# Patient Record
Sex: MALE | Race: WHITE | Employment: OTHER | URBAN - METROPOLITAN AREA
[De-identification: names, ages, dates, MRNs, and addresses within clinical notes are randomized per-mention and may not be internally consistent; named-entity substitution may affect disease eponyms.]

---

## 2017-01-03 ENCOUNTER — HOSPITAL ENCOUNTER (OUTPATIENT)
Dept: PET IMAGING | Age: 62
Discharge: HOME | End: 2017-01-03
Payer: COMMERCIAL

## 2017-01-03 DIAGNOSIS — C01 CANCER OF BASE OF TONGUE (HCC): ICD-10-CM

## 2017-01-03 PROCEDURE — 74011636320 HC RX REV CODE- 636/320: Performed by: OTOLARYNGOLOGY

## 2017-01-03 PROCEDURE — A9552 F18 FDG: HCPCS

## 2017-01-03 RX ORDER — SODIUM CHLORIDE 0.9 % (FLUSH) 0.9 %
10 SYRINGE (ML) INJECTION
Status: COMPLETED | OUTPATIENT
Start: 2017-01-03 | End: 2017-01-03

## 2017-01-03 RX ADMIN — DIATRIZOATE MEGLUMINE AND DIATRIZOATE SODIUM 10 ML: 660; 100 LIQUID ORAL; RECTAL at 07:21

## 2017-01-03 RX ADMIN — Medication 10 ML: at 07:21

## 2017-01-25 ENCOUNTER — HOSPITAL ENCOUNTER (OUTPATIENT)
Dept: LAB | Age: 62
Discharge: HOME | End: 2017-01-25
Payer: COMMERCIAL

## 2017-01-25 DIAGNOSIS — C01 CANCER OF BASE OF TONGUE (HCC): ICD-10-CM

## 2017-01-25 LAB
ALBUMIN SERPL BCP-MCNC: 3.7 G/DL (ref 3.2–4.6)
ALBUMIN/GLOB SERPL: 1.2 {RATIO} (ref 1.2–3.5)
ALP SERPL-CCNC: 67 U/L (ref 50–136)
ALT SERPL-CCNC: 43 U/L (ref 12–65)
ANION GAP BLD CALC-SCNC: 7 MMOL/L (ref 7–16)
AST SERPL W P-5'-P-CCNC: 30 U/L (ref 15–37)
BASOPHILS # BLD AUTO: 0 K/UL (ref 0–0.2)
BASOPHILS # BLD: 0 % (ref 0–2)
BILIRUB SERPL-MCNC: 0.4 MG/DL (ref 0.2–1.1)
BUN SERPL-MCNC: 26 MG/DL (ref 8–23)
CALCIUM SERPL-MCNC: 9 MG/DL (ref 8.3–10.4)
CHLORIDE SERPL-SCNC: 105 MMOL/L (ref 98–107)
CO2 SERPL-SCNC: 27 MMOL/L (ref 23–32)
CREAT SERPL-MCNC: 1.2 MG/DL (ref 0.8–1.5)
DIFFERENTIAL METHOD BLD: ABNORMAL
EOSINOPHIL # BLD: 0.1 K/UL (ref 0–0.8)
EOSINOPHIL NFR BLD: 3 % (ref 0.5–7.8)
ERYTHROCYTE [DISTWIDTH] IN BLOOD BY AUTOMATED COUNT: 12.2 % (ref 11.9–14.6)
GLOBULIN SER CALC-MCNC: 3 G/DL (ref 2.3–3.5)
GLUCOSE SERPL-MCNC: 113 MG/DL (ref 65–100)
HCT VFR BLD AUTO: 41.7 % (ref 41.1–50.3)
HGB BLD-MCNC: 14.3 G/DL (ref 13.6–17.2)
LYMPHOCYTES # BLD AUTO: 16 % (ref 13–44)
LYMPHOCYTES # BLD: 0.6 K/UL (ref 0.5–4.6)
MCH RBC QN AUTO: 32.3 PG (ref 26.1–32.9)
MCHC RBC AUTO-ENTMCNC: 34.3 G/DL (ref 31.4–35)
MCV RBC AUTO: 94.1 FL (ref 79.6–97.8)
MONOCYTES # BLD: 0.5 K/UL (ref 0.1–1.3)
MONOCYTES NFR BLD AUTO: 13 % (ref 4–12)
NEUTS SEG # BLD: 2.5 K/UL (ref 1.7–8.2)
NEUTS SEG NFR BLD AUTO: 67 % (ref 43–78)
NRBC # BLD: 0 K/UL (ref 0–0.2)
PLATELET # BLD AUTO: 142 K/UL (ref 150–450)
PMV BLD AUTO: 10.2 FL (ref 10.8–14.1)
POTASSIUM SERPL-SCNC: 4.2 MMOL/L (ref 3.5–5.1)
PROT SERPL-MCNC: 6.7 G/DL (ref 6.3–8.2)
RBC # BLD AUTO: 4.43 M/UL (ref 4.23–5.67)
SODIUM SERPL-SCNC: 139 MMOL/L (ref 136–145)
WBC # BLD AUTO: 3.7 K/UL (ref 4.3–11.1)

## 2017-01-25 PROCEDURE — 36415 COLL VENOUS BLD VENIPUNCTURE: CPT | Performed by: NURSE PRACTITIONER

## 2017-01-25 PROCEDURE — 80053 COMPREHEN METABOLIC PANEL: CPT | Performed by: NURSE PRACTITIONER

## 2017-01-25 PROCEDURE — 85025 COMPLETE CBC W/AUTO DIFF WBC: CPT | Performed by: NURSE PRACTITIONER

## 2017-07-25 ENCOUNTER — HOSPITAL ENCOUNTER (OUTPATIENT)
Dept: LAB | Age: 62
Discharge: HOME | End: 2017-07-25
Payer: COMMERCIAL

## 2017-07-25 DIAGNOSIS — C76.0 HEAD AND NECK CANCER (HCC): Chronic | ICD-10-CM

## 2017-07-25 DIAGNOSIS — R53.83 FATIGUE, UNSPECIFIED TYPE: ICD-10-CM

## 2017-07-25 LAB
ALBUMIN SERPL BCP-MCNC: 3.7 G/DL (ref 3.2–4.6)
ALBUMIN/GLOB SERPL: 1.1 {RATIO} (ref 1.2–3.5)
ALP SERPL-CCNC: 76 U/L (ref 50–136)
ALT SERPL-CCNC: 51 U/L (ref 12–65)
ANION GAP BLD CALC-SCNC: 6 MMOL/L (ref 7–16)
AST SERPL W P-5'-P-CCNC: 39 U/L (ref 15–37)
BASOPHILS # BLD AUTO: 0 K/UL (ref 0–0.2)
BASOPHILS # BLD: 1 % (ref 0–2)
BILIRUB SERPL-MCNC: 0.7 MG/DL (ref 0.2–1.1)
BUN SERPL-MCNC: 26 MG/DL (ref 8–23)
CALCIUM SERPL-MCNC: 9.2 MG/DL (ref 8.3–10.4)
CHLORIDE SERPL-SCNC: 108 MMOL/L (ref 98–107)
CO2 SERPL-SCNC: 26 MMOL/L (ref 21–32)
CREAT SERPL-MCNC: 1.25 MG/DL (ref 0.8–1.5)
DIFFERENTIAL METHOD BLD: ABNORMAL
EOSINOPHIL # BLD: 0.1 K/UL (ref 0–0.8)
EOSINOPHIL NFR BLD: 3 % (ref 0.5–7.8)
ERYTHROCYTE [DISTWIDTH] IN BLOOD BY AUTOMATED COUNT: 12.6 % (ref 11.9–14.6)
GLOBULIN SER CALC-MCNC: 3.4 G/DL (ref 2.3–3.5)
GLUCOSE SERPL-MCNC: 127 MG/DL (ref 65–100)
HCT VFR BLD AUTO: 41.9 % (ref 41.1–50.3)
HGB BLD-MCNC: 14.9 G/DL (ref 13.6–17.2)
LYMPHOCYTES # BLD AUTO: 16 % (ref 13–44)
LYMPHOCYTES # BLD: 0.6 K/UL (ref 0.5–4.6)
MCH RBC QN AUTO: 32.8 PG (ref 26.1–32.9)
MCHC RBC AUTO-ENTMCNC: 35.6 G/DL (ref 31.4–35)
MCV RBC AUTO: 92.3 FL (ref 79.6–97.8)
MONOCYTES # BLD: 0.5 K/UL (ref 0.1–1.3)
MONOCYTES NFR BLD AUTO: 13 % (ref 4–12)
NEUTS SEG # BLD: 2.4 K/UL (ref 1.7–8.2)
NEUTS SEG NFR BLD AUTO: 67 % (ref 43–78)
NRBC # BLD: 0 K/UL (ref 0–0.2)
PLATELET # BLD AUTO: 152 K/UL (ref 150–450)
PMV BLD AUTO: 10.1 FL (ref 10.8–14.1)
POTASSIUM SERPL-SCNC: 4.1 MMOL/L (ref 3.5–5.1)
PROT SERPL-MCNC: 7.1 G/DL (ref 6.3–8.2)
RBC # BLD AUTO: 4.54 M/UL (ref 4.23–5.67)
SODIUM SERPL-SCNC: 140 MMOL/L (ref 136–145)
TSH SERPL DL<=0.005 MIU/L-ACNC: 3.82 UIU/ML (ref 0.36–3.74)
VIT B12 SERPL-MCNC: 681 PG/ML (ref 193–986)
WBC # BLD AUTO: 3.6 K/UL (ref 4.3–11.1)

## 2017-07-25 PROCEDURE — 36415 COLL VENOUS BLD VENIPUNCTURE: CPT | Performed by: INTERNAL MEDICINE

## 2017-07-25 PROCEDURE — 85025 COMPLETE CBC W/AUTO DIFF WBC: CPT | Performed by: INTERNAL MEDICINE

## 2017-07-25 PROCEDURE — 80053 COMPREHEN METABOLIC PANEL: CPT | Performed by: INTERNAL MEDICINE

## 2017-07-25 PROCEDURE — 82607 VITAMIN B-12: CPT | Performed by: INTERNAL MEDICINE

## 2017-07-25 PROCEDURE — 84443 ASSAY THYROID STIM HORMONE: CPT | Performed by: INTERNAL MEDICINE

## 2017-11-15 ENCOUNTER — APPOINTMENT (RX ONLY)
Dept: URBAN - METROPOLITAN AREA CLINIC 349 | Facility: CLINIC | Age: 62
Setting detail: DERMATOLOGY
End: 2017-11-15

## 2017-11-15 DIAGNOSIS — L57.0 ACTINIC KERATOSIS: ICD-10-CM

## 2017-11-15 DIAGNOSIS — L81.4 OTHER MELANIN HYPERPIGMENTATION: ICD-10-CM

## 2017-11-15 DIAGNOSIS — L82.1 OTHER SEBORRHEIC KERATOSIS: ICD-10-CM

## 2017-11-15 PROCEDURE — A4550 SURGICAL TRAYS: HCPCS

## 2017-11-15 PROCEDURE — ? BIOPSY BY SHAVE METHOD

## 2017-11-15 PROCEDURE — ? COUNSELING

## 2017-11-15 PROCEDURE — ? PATHOLOGY BILLING

## 2017-11-15 PROCEDURE — ? RECOMMENDATIONS

## 2017-11-15 PROCEDURE — 88305 TISSUE EXAM BY PATHOLOGIST: CPT

## 2017-11-15 PROCEDURE — 99242 OFF/OP CONSLTJ NEW/EST SF 20: CPT | Mod: 25

## 2017-11-15 PROCEDURE — ? LIQUID NITROGEN

## 2017-11-15 PROCEDURE — 11306 SHAVE SKIN LESION 0.6-1.0 CM: CPT | Mod: 59

## 2017-11-15 PROCEDURE — 17000 DESTRUCT PREMALG LESION: CPT

## 2017-11-15 ASSESSMENT — LOCATION SIMPLE DESCRIPTION DERM
LOCATION SIMPLE: LEFT PRETIBIAL REGION
LOCATION SIMPLE: RIGHT PRETIBIAL REGION
LOCATION SIMPLE: RIGHT FOREHEAD
LOCATION SIMPLE: LEFT FOREHEAD
LOCATION SIMPLE: LEFT FOREHEAD

## 2017-11-15 ASSESSMENT — LOCATION DETAILED DESCRIPTION DERM
LOCATION DETAILED: LEFT FOREHEAD
LOCATION DETAILED: LEFT PROXIMAL PRETIBIAL REGION
LOCATION DETAILED: LEFT FOREHEAD
LOCATION DETAILED: RIGHT PROXIMAL PRETIBIAL REGION
LOCATION DETAILED: LEFT LATERAL FOREHEAD
LOCATION DETAILED: RIGHT INFERIOR MEDIAL FOREHEAD

## 2017-11-15 ASSESSMENT — LOCATION ZONE DERM
LOCATION ZONE: LEG
LOCATION ZONE: FACE
LOCATION ZONE: FACE

## 2017-11-15 NOTE — PROCEDURE: BIOPSY BY SHAVE METHOD
Additional Anesthesia Volume In Cc (Will Not Render If 0): 0
Anesthesia Volume In Cc (Will Not Render If 0): 0.5
Wound Care: Vaseline
Detail Level: Detailed
Accession #: [email or web address]
Bill For Surgical Tray: yes
Post-Care Instructions: I reviewed with the patient in detail post-care instructions. Patient is to keep the biopsy site dry overnight, and then apply bacitracin twice daily until healed. Patient may apply hydrogen peroxide soaks to remove any crusting.\\nAft the procedure, the patient was observed for 5-10 minutes and was oriented to person, place, and time.  Denied feeling dizzy, queasy, and stated that they did not feel that they were going to faint.
Hemostasis: Aluminum Chloride
Bill 99032 For Specimen Handling/Conveyance To Laboratory?: no
Biopsy Method: Tamera pruitt
Notification Instructions: Patient will be notified of biopsy results. However, patient instructed to call the office if not contacted within 2 weeks.
Billing Type: Third-Party Bill
Type Of Destruction Used: Electrodesiccation
Anesthesia Type: 1% lidocaine with 1:500,000 epinephrine and a 1:10 solution of 8.4% sodium bicarbonate
Biopsy Type: H and E
Electrodesiccation Text: The wound bed was treated with electrodesiccation after the biopsy was performed.
Dressing: bandage
Cryotherapy Text: The wound bed was treated with cryotherapy after the biopsy was performed.
Size Of Lesion In Cm: 0.6
Consent: Written consent was obtained and risks were reviewed including but not limited to scarring, infection, bleeding, scabbing, incomplete removal, nerve damage and allergy to anesthesia.

## 2017-11-15 NOTE — HPI: SKIN LESION
How Severe Is Your Skin Lesion?: moderate
Has Your Skin Lesion Been Treated?: not been treated
Is This A New Presentation, Or A Follow-Up?: Skin Lesion
Additional History: Patient states he has a hx squamous cell carcinoma of the tongue/throat.

## 2018-01-31 ENCOUNTER — HOSPITAL ENCOUNTER (OUTPATIENT)
Dept: LAB | Age: 63
Discharge: HOME | End: 2018-01-31
Payer: COMMERCIAL

## 2018-01-31 DIAGNOSIS — C09.9 SQUAMOUS CELL CARCINOMA OF TONSIL (HCC): ICD-10-CM

## 2018-01-31 LAB
ALBUMIN SERPL-MCNC: 3.8 G/DL (ref 3.2–4.6)
ALBUMIN/GLOB SERPL: 1.2 {RATIO} (ref 1.2–3.5)
ALP SERPL-CCNC: 67 U/L (ref 50–136)
ALT SERPL-CCNC: 70 U/L (ref 12–65)
ANION GAP SERPL CALC-SCNC: 7 MMOL/L (ref 7–16)
AST SERPL-CCNC: 48 U/L (ref 15–37)
BASOPHILS # BLD: 0 K/UL (ref 0–0.2)
BASOPHILS NFR BLD: 1 % (ref 0–2)
BILIRUB SERPL-MCNC: 0.5 MG/DL (ref 0.2–1.1)
BUN SERPL-MCNC: 29 MG/DL (ref 8–23)
CALCIUM SERPL-MCNC: 9.2 MG/DL (ref 8.3–10.4)
CHLORIDE SERPL-SCNC: 108 MMOL/L (ref 98–107)
CO2 SERPL-SCNC: 26 MMOL/L (ref 21–32)
CREAT SERPL-MCNC: 1.28 MG/DL (ref 0.8–1.5)
DIFFERENTIAL METHOD BLD: ABNORMAL
EOSINOPHIL # BLD: 0.1 K/UL (ref 0–0.8)
EOSINOPHIL NFR BLD: 2 % (ref 0.5–7.8)
ERYTHROCYTE [DISTWIDTH] IN BLOOD BY AUTOMATED COUNT: 12.2 % (ref 11.9–14.6)
GLOBULIN SER CALC-MCNC: 3.1 G/DL (ref 2.3–3.5)
GLUCOSE SERPL-MCNC: 88 MG/DL (ref 65–100)
HCT VFR BLD AUTO: 41.8 % (ref 41.1–50.3)
HGB BLD-MCNC: 14.6 G/DL (ref 13.6–17.2)
LYMPHOCYTES # BLD: 0.6 K/UL (ref 0.5–4.6)
LYMPHOCYTES NFR BLD: 17 % (ref 13–44)
MCH RBC QN AUTO: 32.7 PG (ref 26.1–32.9)
MCHC RBC AUTO-ENTMCNC: 34.9 G/DL (ref 31.4–35)
MCV RBC AUTO: 93.5 FL (ref 79.6–97.8)
MONOCYTES # BLD: 0.5 K/UL (ref 0.1–1.3)
MONOCYTES NFR BLD: 14 % (ref 4–12)
NEUTS SEG # BLD: 2.4 K/UL (ref 1.7–8.2)
NEUTS SEG NFR BLD: 67 % (ref 43–78)
NRBC # BLD: 0 K/UL (ref 0–0.2)
PLATELET # BLD AUTO: 170 K/UL (ref 150–450)
PMV BLD AUTO: 10.2 FL (ref 10.8–14.1)
POTASSIUM SERPL-SCNC: 4.3 MMOL/L (ref 3.5–5.1)
PROT SERPL-MCNC: 6.9 G/DL (ref 6.3–8.2)
RBC # BLD AUTO: 4.47 M/UL (ref 4.23–5.67)
SODIUM SERPL-SCNC: 141 MMOL/L (ref 136–145)
WBC # BLD AUTO: 3.6 K/UL (ref 4.3–11.1)

## 2018-01-31 PROCEDURE — 80053 COMPREHEN METABOLIC PANEL: CPT | Performed by: INTERNAL MEDICINE

## 2018-01-31 PROCEDURE — 85025 COMPLETE CBC W/AUTO DIFF WBC: CPT | Performed by: INTERNAL MEDICINE

## 2018-01-31 PROCEDURE — 36415 COLL VENOUS BLD VENIPUNCTURE: CPT | Performed by: INTERNAL MEDICINE

## 2018-02-22 ENCOUNTER — APPOINTMENT (RX ONLY)
Dept: URBAN - METROPOLITAN AREA CLINIC 349 | Facility: CLINIC | Age: 63
Setting detail: DERMATOLOGY
End: 2018-02-22

## 2018-02-22 DIAGNOSIS — L81.4 OTHER MELANIN HYPERPIGMENTATION: ICD-10-CM

## 2018-02-22 DIAGNOSIS — L57.0 ACTINIC KERATOSIS: ICD-10-CM | Status: RESOLVED

## 2018-02-22 PROCEDURE — ? COUNSELING

## 2018-02-22 PROCEDURE — 99213 OFFICE O/P EST LOW 20 MIN: CPT

## 2018-02-22 ASSESSMENT — LOCATION DETAILED DESCRIPTION DERM
LOCATION DETAILED: LEFT FOREHEAD
LOCATION DETAILED: RIGHT INFERIOR MEDIAL FOREHEAD
LOCATION DETAILED: RIGHT CENTRAL MALAR CHEEK
LOCATION DETAILED: LEFT SUPERIOR CENTRAL MALAR CHEEK

## 2018-02-22 ASSESSMENT — LOCATION SIMPLE DESCRIPTION DERM
LOCATION SIMPLE: LEFT CHEEK
LOCATION SIMPLE: RIGHT CHEEK
LOCATION SIMPLE: RIGHT FOREHEAD
LOCATION SIMPLE: LEFT FOREHEAD

## 2018-02-22 ASSESSMENT — LOCATION ZONE DERM: LOCATION ZONE: FACE

## 2018-05-24 ENCOUNTER — APPOINTMENT (RX ONLY)
Dept: URBAN - METROPOLITAN AREA CLINIC 349 | Facility: CLINIC | Age: 63
Setting detail: DERMATOLOGY
End: 2018-05-24

## 2018-05-24 PROBLEM — C44.319 BASAL CELL CARCINOMA OF SKIN OF OTHER PARTS OF FACE: Status: ACTIVE | Noted: 2018-05-24

## 2018-05-24 PROCEDURE — ? COUNSELING

## 2018-05-24 PROCEDURE — A4550 SURGICAL TRAYS: HCPCS

## 2018-05-24 PROCEDURE — ? PATHOLOGY BILLING

## 2018-05-24 PROCEDURE — 11100: CPT

## 2018-05-24 PROCEDURE — ? BIOPSY BY SHAVE METHOD

## 2018-05-24 PROCEDURE — 88305 TISSUE EXAM BY PATHOLOGIST: CPT

## 2018-05-24 NOTE — PROCEDURE: BIOPSY BY SHAVE METHOD
Biopsy Method: Tamera pruitt
Notification Instructions: Patient will be notified of biopsy results. However, patient instructed to call the office if not contacted within 2 weeks.
Electrodesiccation Text: The wound bed was treated with electrodesiccation after the biopsy was performed.
Accession #: [email or web address]
Type Of Destruction Used: Electrodesiccation
Render Post-Care Instructions In Note?: yes
Hemostasis: Aluminum Chloride
Size Of Lesion In Cm: 0
Destruction After The Procedure: No
Billing Type: Third-Party Bill
Consent: Written consent was obtained and risks were reviewed including but not limited to scarring, infection, bleeding, scabbing, incomplete removal, nerve damage and allergy to anesthesia.
Cryotherapy Text: The wound bed was treated with cryotherapy after the biopsy was performed.
Biopsy Type: H and E
X Size Of Lesion In Cm: 0.6
Post-Care Instructions: I reviewed with the patient in detail post-care instructions. Patient is to keep the biopsy site dry overnight, and then apply bacitracin twice daily until healed. Patient may apply hydrogen peroxide soaks to remove any crusting.\\nAft the procedure, the patient was observed for 5-10 minutes and was oriented to person, place, and time.  Denied feeling dizzy, queasy, and stated that they did not feel that they were going to faint.
Wound Care: Vaseline
Anesthesia Type: 1% lidocaine with 1:500,000 epinephrine and a 1:10 solution of 8.4% sodium bicarbonate
Detail Level: Detailed
Dressing: bandage
Anesthesia Volume In Cc (Will Not Render If 0): 0.5

## 2018-07-31 ENCOUNTER — HOSPITAL ENCOUNTER (OUTPATIENT)
Dept: LAB | Age: 63
Discharge: HOME | End: 2018-07-31
Payer: COMMERCIAL

## 2018-07-31 DIAGNOSIS — C09.9 SQUAMOUS CELL CARCINOMA OF TONSIL (HCC): ICD-10-CM

## 2018-07-31 LAB
ALBUMIN SERPL-MCNC: 3.8 G/DL (ref 3.2–4.6)
ALBUMIN/GLOB SERPL: 1.4 {RATIO} (ref 1.2–3.5)
ALP SERPL-CCNC: 69 U/L (ref 50–136)
ALT SERPL-CCNC: 84 U/L (ref 12–65)
ANION GAP SERPL CALC-SCNC: 8 MMOL/L (ref 7–16)
AST SERPL-CCNC: 57 U/L (ref 15–37)
BASOPHILS # BLD: 0 K/UL (ref 0–0.2)
BASOPHILS NFR BLD: 0 % (ref 0–2)
BILIRUB SERPL-MCNC: 0.4 MG/DL (ref 0.2–1.1)
BUN SERPL-MCNC: 29 MG/DL (ref 8–23)
CALCIUM SERPL-MCNC: 9.1 MG/DL (ref 8.3–10.4)
CHLORIDE SERPL-SCNC: 107 MMOL/L (ref 98–107)
CO2 SERPL-SCNC: 25 MMOL/L (ref 21–32)
CREAT SERPL-MCNC: 1.4 MG/DL (ref 0.8–1.5)
DIFFERENTIAL METHOD BLD: ABNORMAL
EOSINOPHIL # BLD: 0.1 K/UL (ref 0–0.8)
EOSINOPHIL NFR BLD: 4 % (ref 0.5–7.8)
ERYTHROCYTE [DISTWIDTH] IN BLOOD BY AUTOMATED COUNT: 12.3 % (ref 11.9–14.6)
GLOBULIN SER CALC-MCNC: 2.8 G/DL (ref 2.3–3.5)
GLUCOSE SERPL-MCNC: 129 MG/DL (ref 65–100)
HCT VFR BLD AUTO: 41.3 % (ref 41.1–50.3)
HGB BLD-MCNC: 14.4 G/DL (ref 13.6–17.2)
LYMPHOCYTES # BLD: 0.6 K/UL (ref 0.5–4.6)
LYMPHOCYTES NFR BLD: 17 % (ref 13–44)
MCH RBC QN AUTO: 32.4 PG (ref 26.1–32.9)
MCHC RBC AUTO-ENTMCNC: 34.9 G/DL (ref 31.4–35)
MCV RBC AUTO: 92.8 FL (ref 79.6–97.8)
MONOCYTES # BLD: 0.5 K/UL (ref 0.1–1.3)
MONOCYTES NFR BLD: 13 % (ref 4–12)
NEUTS SEG # BLD: 2.4 K/UL (ref 1.7–8.2)
NEUTS SEG NFR BLD: 66 % (ref 43–78)
NRBC # BLD: 0 K/UL (ref 0–0.2)
PLATELET # BLD AUTO: 165 K/UL (ref 150–450)
PMV BLD AUTO: 10.4 FL (ref 10.8–14.1)
POTASSIUM SERPL-SCNC: 4.4 MMOL/L (ref 3.5–5.1)
PROT SERPL-MCNC: 6.6 G/DL (ref 6.3–8.2)
RBC # BLD AUTO: 4.45 M/UL (ref 4.23–5.67)
SODIUM SERPL-SCNC: 140 MMOL/L (ref 136–145)
WBC # BLD AUTO: 3.6 K/UL (ref 4.3–11.1)

## 2018-07-31 PROCEDURE — 80053 COMPREHEN METABOLIC PANEL: CPT | Performed by: INTERNAL MEDICINE

## 2018-07-31 PROCEDURE — 85025 COMPLETE CBC W/AUTO DIFF WBC: CPT | Performed by: INTERNAL MEDICINE

## 2018-07-31 PROCEDURE — 36415 COLL VENOUS BLD VENIPUNCTURE: CPT | Performed by: INTERNAL MEDICINE

## 2019-02-04 ENCOUNTER — HOSPITAL ENCOUNTER (OUTPATIENT)
Dept: LAB | Age: 64
Discharge: HOME | End: 2019-02-04
Payer: MEDICARE

## 2019-02-04 DIAGNOSIS — C01 CANCER OF BASE OF TONGUE (HCC): ICD-10-CM

## 2019-02-04 DIAGNOSIS — E03.9 ACQUIRED HYPOTHYROIDISM: ICD-10-CM

## 2019-02-04 LAB
ALBUMIN SERPL-MCNC: 3.8 G/DL (ref 3.2–4.6)
ALBUMIN/GLOB SERPL: 1.2 {RATIO} (ref 1.2–3.5)
ALP SERPL-CCNC: 67 U/L (ref 50–136)
ALT SERPL-CCNC: 78 U/L (ref 12–65)
ANION GAP SERPL CALC-SCNC: 6 MMOL/L (ref 7–16)
AST SERPL-CCNC: 47 U/L (ref 15–37)
BASOPHILS # BLD: 0 K/UL (ref 0–0.2)
BASOPHILS NFR BLD: 1 % (ref 0–2)
BILIRUB SERPL-MCNC: 0.5 MG/DL (ref 0.2–1.1)
BUN SERPL-MCNC: 28 MG/DL (ref 8–23)
CALCIUM SERPL-MCNC: 8.9 MG/DL (ref 8.3–10.4)
CHLORIDE SERPL-SCNC: 106 MMOL/L (ref 98–107)
CO2 SERPL-SCNC: 27 MMOL/L (ref 21–32)
CREAT SERPL-MCNC: 1.29 MG/DL (ref 0.8–1.5)
DIFFERENTIAL METHOD BLD: ABNORMAL
EOSINOPHIL # BLD: 0.1 K/UL (ref 0–0.8)
EOSINOPHIL NFR BLD: 3 % (ref 0.5–7.8)
ERYTHROCYTE [DISTWIDTH] IN BLOOD BY AUTOMATED COUNT: 12.4 % (ref 11.9–14.6)
GLOBULIN SER CALC-MCNC: 3.1 G/DL (ref 2.3–3.5)
GLUCOSE SERPL-MCNC: 165 MG/DL (ref 65–100)
HCT VFR BLD AUTO: 42.5 % (ref 41.1–50.3)
HGB BLD-MCNC: 14.7 G/DL (ref 13.6–17.2)
IMM GRANULOCYTES # BLD AUTO: 0 K/UL (ref 0–0.5)
IMM GRANULOCYTES NFR BLD AUTO: 0 % (ref 0–5)
LYMPHOCYTES # BLD: 0.6 K/UL (ref 0.5–4.6)
LYMPHOCYTES NFR BLD: 16 % (ref 13–44)
MCH RBC QN AUTO: 32.7 PG (ref 26.1–32.9)
MCHC RBC AUTO-ENTMCNC: 34.6 G/DL (ref 31.4–35)
MCV RBC AUTO: 94.4 FL (ref 79.6–97.8)
MONOCYTES # BLD: 0.4 K/UL (ref 0.1–1.3)
MONOCYTES NFR BLD: 10 % (ref 4–12)
NEUTS SEG # BLD: 2.7 K/UL (ref 1.7–8.2)
NEUTS SEG NFR BLD: 70 % (ref 43–78)
NRBC # BLD: 0 K/UL (ref 0–0.2)
PLATELET # BLD AUTO: 155 K/UL (ref 150–450)
PMV BLD AUTO: 10.3 FL (ref 9.4–12.3)
POTASSIUM SERPL-SCNC: 4.3 MMOL/L (ref 3.5–5.1)
PROT SERPL-MCNC: 6.9 G/DL (ref 6.3–8.2)
RBC # BLD AUTO: 4.5 M/UL (ref 4.23–5.67)
SODIUM SERPL-SCNC: 139 MMOL/L (ref 136–145)
TSH SERPL DL<=0.005 MIU/L-ACNC: 2.42 UIU/ML (ref 0.36–3.74)
WBC # BLD AUTO: 3.9 K/UL (ref 4.3–11.1)

## 2019-02-04 PROCEDURE — 80053 COMPREHEN METABOLIC PANEL: CPT

## 2019-02-04 PROCEDURE — 85025 COMPLETE CBC W/AUTO DIFF WBC: CPT

## 2019-02-04 PROCEDURE — 84443 ASSAY THYROID STIM HORMONE: CPT

## 2019-02-04 PROCEDURE — 36415 COLL VENOUS BLD VENIPUNCTURE: CPT

## 2019-08-07 ENCOUNTER — HOSPITAL ENCOUNTER (OUTPATIENT)
Dept: LAB | Age: 64
Discharge: HOME | End: 2019-08-07
Payer: MEDICARE

## 2019-08-07 DIAGNOSIS — C09.9 SQUAMOUS CELL CARCINOMA OF TONSIL (HCC): ICD-10-CM

## 2019-08-07 LAB
ALBUMIN SERPL-MCNC: 3.5 G/DL (ref 3.2–4.6)
ALBUMIN/GLOB SERPL: 1.2 {RATIO} (ref 1.2–3.5)
ALP SERPL-CCNC: 70 U/L (ref 50–136)
ALT SERPL-CCNC: 56 U/L (ref 12–65)
ANION GAP SERPL CALC-SCNC: 7 MMOL/L (ref 7–16)
AST SERPL-CCNC: 36 U/L (ref 15–37)
BASOPHILS # BLD: 0 K/UL (ref 0–0.2)
BASOPHILS NFR BLD: 1 % (ref 0–2)
BILIRUB SERPL-MCNC: 0.6 MG/DL (ref 0.2–1.1)
BUN SERPL-MCNC: 20 MG/DL (ref 8–23)
CALCIUM SERPL-MCNC: 9.3 MG/DL (ref 8.3–10.4)
CHLORIDE SERPL-SCNC: 107 MMOL/L (ref 98–107)
CO2 SERPL-SCNC: 26 MMOL/L (ref 21–32)
CREAT SERPL-MCNC: 1.24 MG/DL (ref 0.8–1.5)
DIFFERENTIAL METHOD BLD: ABNORMAL
EOSINOPHIL # BLD: 0.1 K/UL (ref 0–0.8)
EOSINOPHIL NFR BLD: 2 % (ref 0.5–7.8)
ERYTHROCYTE [DISTWIDTH] IN BLOOD BY AUTOMATED COUNT: 12 % (ref 11.9–14.6)
GLOBULIN SER CALC-MCNC: 3 G/DL (ref 2.3–3.5)
GLUCOSE SERPL-MCNC: 84 MG/DL (ref 65–100)
HCT VFR BLD AUTO: 41.6 % (ref 41.1–50.3)
HGB BLD-MCNC: 14.2 G/DL (ref 13.6–17.2)
IMM GRANULOCYTES # BLD AUTO: 0 K/UL (ref 0–0.5)
IMM GRANULOCYTES NFR BLD AUTO: 0 % (ref 0–5)
LYMPHOCYTES # BLD: 0.8 K/UL (ref 0.5–4.6)
LYMPHOCYTES NFR BLD: 19 % (ref 13–44)
MCH RBC QN AUTO: 31.7 PG (ref 26.1–32.9)
MCHC RBC AUTO-ENTMCNC: 34.1 G/DL (ref 31.4–35)
MCV RBC AUTO: 92.9 FL (ref 79.6–97.8)
MONOCYTES # BLD: 0.5 K/UL (ref 0.1–1.3)
MONOCYTES NFR BLD: 12 % (ref 4–12)
NEUTS SEG # BLD: 2.7 K/UL (ref 1.7–8.2)
NEUTS SEG NFR BLD: 67 % (ref 43–78)
NRBC # BLD: 0 K/UL (ref 0–0.2)
PLATELET # BLD AUTO: 201 K/UL (ref 150–450)
PMV BLD AUTO: 9.9 FL (ref 9.4–12.3)
POTASSIUM SERPL-SCNC: 4.2 MMOL/L (ref 3.5–5.1)
PROT SERPL-MCNC: 6.5 G/DL (ref 6.3–8.2)
RBC # BLD AUTO: 4.48 M/UL (ref 4.23–5.67)
SODIUM SERPL-SCNC: 140 MMOL/L (ref 136–145)
WBC # BLD AUTO: 4.1 K/UL (ref 4.3–11.1)

## 2019-08-07 PROCEDURE — 36415 COLL VENOUS BLD VENIPUNCTURE: CPT

## 2019-08-07 PROCEDURE — 80053 COMPREHEN METABOLIC PANEL: CPT

## 2019-08-07 PROCEDURE — 85025 COMPLETE CBC W/AUTO DIFF WBC: CPT

## 2019-09-26 ENCOUNTER — APPOINTMENT (RX ONLY)
Dept: URBAN - METROPOLITAN AREA CLINIC 349 | Facility: CLINIC | Age: 64
Setting detail: DERMATOLOGY
End: 2019-09-26

## 2019-09-26 DIAGNOSIS — Z71.89 OTHER SPECIFIED COUNSELING: ICD-10-CM

## 2019-09-26 DIAGNOSIS — D22 MELANOCYTIC NEVI: ICD-10-CM

## 2019-09-26 DIAGNOSIS — L81.4 OTHER MELANIN HYPERPIGMENTATION: ICD-10-CM

## 2019-09-26 PROBLEM — D22.61 MELANOCYTIC NEVI OF RIGHT UPPER LIMB, INCLUDING SHOULDER: Status: ACTIVE | Noted: 2019-09-26

## 2019-09-26 PROBLEM — D22.62 MELANOCYTIC NEVI OF LEFT UPPER LIMB, INCLUDING SHOULDER: Status: ACTIVE | Noted: 2019-09-26

## 2019-09-26 PROCEDURE — ? COUNSELING

## 2019-09-26 PROCEDURE — 99213 OFFICE O/P EST LOW 20 MIN: CPT | Mod: 25

## 2019-09-26 PROCEDURE — ? OTHER

## 2019-09-26 PROCEDURE — 17110 DESTRUCTION B9 LES UP TO 14: CPT

## 2019-09-26 PROCEDURE — ? BENIGN DESTRUCTION

## 2019-09-26 ASSESSMENT — LOCATION DETAILED DESCRIPTION DERM
LOCATION DETAILED: LEFT MEDIAL INFERIOR CHEST
LOCATION DETAILED: RIGHT SUPERIOR TEMPLE
LOCATION DETAILED: RIGHT MEDIAL FOREHEAD
LOCATION DETAILED: LEFT DISTAL POSTERIOR UPPER ARM
LOCATION DETAILED: LEFT SUPERIOR FOREHEAD
LOCATION DETAILED: RIGHT SUPERIOR MEDIAL FOREHEAD
LOCATION DETAILED: LEFT LATERAL FOREHEAD
LOCATION DETAILED: RIGHT LATERAL FOREHEAD
LOCATION DETAILED: RIGHT DISTAL POSTERIOR UPPER ARM
LOCATION DETAILED: RIGHT SUPERIOR LATERAL FOREHEAD

## 2019-09-26 ASSESSMENT — LOCATION ZONE DERM
LOCATION ZONE: TRUNK
LOCATION ZONE: FACE
LOCATION ZONE: ARM

## 2019-09-26 ASSESSMENT — LOCATION SIMPLE DESCRIPTION DERM
LOCATION SIMPLE: RIGHT FOREHEAD
LOCATION SIMPLE: RIGHT UPPER ARM
LOCATION SIMPLE: CHEST
LOCATION SIMPLE: RIGHT TEMPLE
LOCATION SIMPLE: LEFT UPPER ARM
LOCATION SIMPLE: LEFT FOREHEAD

## 2019-09-26 NOTE — PROCEDURE: OTHER
Note Text (......Xxx Chief Complaint.): This diagnosis correlates with the
Detail Level: Detailed
Other (Free Text): Recommended dermend for bruises

## 2019-09-26 NOTE — PROCEDURE: BENIGN DESTRUCTION
Render Note In Bullet Format When Appropriate: No
Anesthesia Volume In Cc: 0
Post-Care Instructions: I reviewed with the patient in detail post-care instructions. Patient is to wear sunprotection, and avoid picking at any of the treated lesions. Pt may apply Vaseline to crusted or scabbing areas.
Medical Necessity Information: It is in your best interest to select a reason for this procedure from the list below. All of these items fulfill various CMS LCD requirements except the new and changing color options.
Consent: The patient's consent was obtained including but not limited to risks of crusting, scabbing, blistering, scarring, darker or lighter pigmentary change, recurrence, incomplete removal and infection.
Detail Level: Detailed
Medical Necessity Clause: This procedure was medically necessary because the lesions that were treated were:

## 2019-12-02 ENCOUNTER — APPOINTMENT (RX ONLY)
Dept: URBAN - METROPOLITAN AREA CLINIC 349 | Facility: CLINIC | Age: 64
Setting detail: DERMATOLOGY
End: 2019-12-02

## 2019-12-02 DIAGNOSIS — L81.4 OTHER MELANIN HYPERPIGMENTATION: ICD-10-CM

## 2019-12-02 DIAGNOSIS — Z71.89 OTHER SPECIFIED COUNSELING: ICD-10-CM

## 2019-12-02 DIAGNOSIS — L30.8 OTHER SPECIFIED DERMATITIS: ICD-10-CM

## 2019-12-02 DIAGNOSIS — D22 MELANOCYTIC NEVI: ICD-10-CM

## 2019-12-02 DIAGNOSIS — D18.0 HEMANGIOMA: ICD-10-CM

## 2019-12-02 PROBLEM — D22.5 MELANOCYTIC NEVI OF TRUNK: Status: ACTIVE | Noted: 2019-12-02

## 2019-12-02 PROBLEM — D18.01 HEMANGIOMA OF SKIN AND SUBCUTANEOUS TISSUE: Status: ACTIVE | Noted: 2019-12-02

## 2019-12-02 PROCEDURE — ? RECOMMENDATIONS

## 2019-12-02 PROCEDURE — ? PATHOLOGY BILLING

## 2019-12-02 PROCEDURE — 11301 SHAVE SKIN LESION 0.6-1.0 CM: CPT

## 2019-12-02 PROCEDURE — ? COUNSELING

## 2019-12-02 PROCEDURE — ? SHAVE REMOVAL

## 2019-12-02 PROCEDURE — ? PRESCRIPTION

## 2019-12-02 PROCEDURE — A4550 SURGICAL TRAYS: HCPCS

## 2019-12-02 PROCEDURE — 99214 OFFICE O/P EST MOD 30 MIN: CPT | Mod: 25

## 2019-12-02 PROCEDURE — 88305 TISSUE EXAM BY PATHOLOGIST: CPT

## 2019-12-02 RX ORDER — TRIAMCINOLONE ACETONIDE 1 MG/G
CREAM TOPICAL BID
Qty: 1 | Refills: 3 | Status: ERX | COMMUNITY
Start: 2019-12-02

## 2019-12-02 RX ADMIN — TRIAMCINOLONE ACETONIDE: 1 CREAM TOPICAL at 00:00

## 2019-12-02 ASSESSMENT — LOCATION SIMPLE DESCRIPTION DERM
LOCATION SIMPLE: LEFT UPPER BACK
LOCATION SIMPLE: ABDOMEN
LOCATION SIMPLE: LEFT THIGH
LOCATION SIMPLE: LEFT THIGH
LOCATION SIMPLE: CHEST
LOCATION SIMPLE: LEFT LOWER BACK

## 2019-12-02 ASSESSMENT — LOCATION DETAILED DESCRIPTION DERM
LOCATION DETAILED: LEFT ANTERIOR PROXIMAL THIGH
LOCATION DETAILED: LEFT INFERIOR UPPER BACK
LOCATION DETAILED: LEFT SUPERIOR UPPER BACK
LOCATION DETAILED: LEFT MID-UPPER BACK
LOCATION DETAILED: LEFT MEDIAL SUPERIOR CHEST
LOCATION DETAILED: LEFT SUPERIOR MEDIAL MIDBACK
LOCATION DETAILED: LEFT LATERAL INFERIOR CHEST
LOCATION DETAILED: LEFT MEDIAL UPPER BACK
LOCATION DETAILED: LEFT ANTERIOR PROXIMAL THIGH
LOCATION DETAILED: PERIUMBILICAL SKIN

## 2019-12-02 ASSESSMENT — LOCATION ZONE DERM
LOCATION ZONE: TRUNK
LOCATION ZONE: LEG
LOCATION ZONE: LEG

## 2019-12-02 NOTE — PROCEDURE: RECOMMENDATIONS
Detail Level: Zone
Recommendations (Free Text): Triamcinolone cream twice daily to affected area for two weeks then as needed \\nCerave moisturizer twice a day

## 2019-12-02 NOTE — PROCEDURE: PATHOLOGY BILLING
Immunohistochemistry ([id number] and [id number]) billing is not performed here. Please use the Immunohistochemistry Stain Billing plan to accomplish this. Immunohistochemistry ([id number] and [id number]) billing is not performed here. Please use the Immunohistochemistry Stain Billing plan to accomplish this.

## 2019-12-02 NOTE — PROCEDURE: SHAVE REMOVAL
Detail Level: Detailed
Notification Instructions: Patient will be notified of biopsy results. However, patient instructed to call the office if not contacted within 2 weeks.
X Size Of Lesion In Cm (Optional): 0
Medical Necessity Clause: This procedure was medically necessary because the lesion that was treated was: irritated and two tone color
Accession #: [email or web address]
Anesthesia Volume In Cc: 0.6
Medical Necessity Information: It is in your best interest to select a reason for this procedure from the list below. All of these items fulfill various CMS LCD requirements except the new and changing color options.
Bill 30109 For Specimen Handling/Conveyance To Laboratory?: no
Was A Bandage Applied: Yes
Hemostasis: Aluminum Chloride
Billing Type: Third-Party Bill
Anesthesia Type: 1% lidocaine with epinephrine and a 1:10 solution of 8.4% sodium bicarbonate
Post-Care Instructions: I reviewed with the patient in detail post-care instructions. Patient is to keep the biopsy site dry overnight, and then apply bacitracin twice daily until healed. Patient may apply hydrogen peroxide soaks to remove any crusting.
Consent was obtained from the patient. The risks and benefits to therapy were discussed in detail. Specifically, the risks of infection, scarring, bleeding, prolonged wound healing, incomplete removal, allergy to anesthesia, nerve injury and recurrence were addressed. Prior to the procedure, the treatment site was clearly identified and confirmed by the patient. All components of Universal Protocol/PAUSE Rule completed.
Size Of Lesion In Cm (Required): 0.8
Biopsy Method: Personna blade
Wound Care: Vaseline

## 2020-02-07 ENCOUNTER — HOSPITAL ENCOUNTER (OUTPATIENT)
Dept: LAB | Age: 65
Discharge: HOME | End: 2020-02-07
Payer: MEDICARE

## 2020-02-07 DIAGNOSIS — C09.9 SQUAMOUS CELL CARCINOMA OF TONSIL (HCC): ICD-10-CM

## 2020-02-07 LAB
ALBUMIN SERPL-MCNC: 3.7 G/DL (ref 3.2–4.6)
ALBUMIN/GLOB SERPL: 1.2 {RATIO} (ref 1.2–3.5)
ALP SERPL-CCNC: 80 U/L (ref 50–136)
ALT SERPL-CCNC: 45 U/L (ref 12–65)
ANION GAP SERPL CALC-SCNC: 5 MMOL/L (ref 7–16)
AST SERPL-CCNC: 36 U/L (ref 15–37)
BASOPHILS # BLD: 0 K/UL (ref 0–0.2)
BASOPHILS NFR BLD: 1 % (ref 0–2)
BILIRUB SERPL-MCNC: 0.6 MG/DL (ref 0.2–1.1)
BUN SERPL-MCNC: 20 MG/DL (ref 8–23)
CALCIUM SERPL-MCNC: 9.2 MG/DL (ref 8.3–10.4)
CHLORIDE SERPL-SCNC: 109 MMOL/L (ref 98–107)
CO2 SERPL-SCNC: 27 MMOL/L (ref 21–32)
CREAT SERPL-MCNC: 1.2 MG/DL (ref 0.8–1.5)
DIFFERENTIAL METHOD BLD: ABNORMAL
EOSINOPHIL # BLD: 0.1 K/UL (ref 0–0.8)
EOSINOPHIL NFR BLD: 2 % (ref 0.5–7.8)
ERYTHROCYTE [DISTWIDTH] IN BLOOD BY AUTOMATED COUNT: 12.1 % (ref 11.9–14.6)
GLOBULIN SER CALC-MCNC: 3.1 G/DL (ref 2.3–3.5)
GLUCOSE SERPL-MCNC: 84 MG/DL (ref 65–100)
HCT VFR BLD AUTO: 41.5 % (ref 41.1–50.3)
HGB BLD-MCNC: 13.8 G/DL (ref 13.6–17.2)
IMM GRANULOCYTES # BLD AUTO: 0 K/UL (ref 0–0.5)
IMM GRANULOCYTES NFR BLD AUTO: 0 % (ref 0–5)
LYMPHOCYTES # BLD: 0.8 K/UL (ref 0.5–4.6)
LYMPHOCYTES NFR BLD: 27 % (ref 13–44)
MCH RBC QN AUTO: 31.4 PG (ref 26.1–32.9)
MCHC RBC AUTO-ENTMCNC: 33.3 G/DL (ref 31.4–35)
MCV RBC AUTO: 94.5 FL (ref 79.6–97.8)
MONOCYTES # BLD: 0.4 K/UL (ref 0.1–1.3)
MONOCYTES NFR BLD: 12 % (ref 4–12)
NEUTS SEG # BLD: 1.8 K/UL (ref 1.7–8.2)
NEUTS SEG NFR BLD: 59 % (ref 43–78)
NRBC # BLD: 0 K/UL (ref 0–0.2)
PLATELET # BLD AUTO: 181 K/UL (ref 150–450)
PMV BLD AUTO: 9.7 FL (ref 9.4–12.3)
POTASSIUM SERPL-SCNC: 4 MMOL/L (ref 3.5–5.1)
PROT SERPL-MCNC: 6.8 G/DL (ref 6.3–8.2)
RBC # BLD AUTO: 4.39 M/UL (ref 4.23–5.67)
SODIUM SERPL-SCNC: 141 MMOL/L (ref 136–145)
WBC # BLD AUTO: 3.1 K/UL (ref 4.3–11.1)

## 2020-02-07 PROCEDURE — 80053 COMPREHEN METABOLIC PANEL: CPT

## 2020-02-07 PROCEDURE — 85025 COMPLETE CBC W/AUTO DIFF WBC: CPT

## 2020-02-07 PROCEDURE — 36415 COLL VENOUS BLD VENIPUNCTURE: CPT

## 2020-08-06 ENCOUNTER — HOSPITAL ENCOUNTER (OUTPATIENT)
Dept: LAB | Age: 65
Discharge: HOME | End: 2020-08-06
Payer: MEDICARE

## 2020-08-06 DIAGNOSIS — C76.0 HEAD AND NECK CANCER (HCC): ICD-10-CM

## 2020-08-06 LAB
ALBUMIN SERPL-MCNC: 3.4 G/DL (ref 3.2–4.6)
ALBUMIN/GLOB SERPL: 1.1 {RATIO} (ref 1.2–3.5)
ALP SERPL-CCNC: 72 U/L (ref 50–136)
ALT SERPL-CCNC: 27 U/L (ref 12–65)
ANION GAP SERPL CALC-SCNC: 5 MMOL/L (ref 7–16)
AST SERPL-CCNC: 23 U/L (ref 15–37)
BASOPHILS # BLD: 0 K/UL (ref 0–0.2)
BASOPHILS NFR BLD: 0 % (ref 0–2)
BILIRUB SERPL-MCNC: 0.6 MG/DL (ref 0.2–1.1)
BUN SERPL-MCNC: 18 MG/DL (ref 8–23)
CALCIUM SERPL-MCNC: 8.7 MG/DL (ref 8.3–10.4)
CHLORIDE SERPL-SCNC: 110 MMOL/L (ref 98–107)
CO2 SERPL-SCNC: 26 MMOL/L (ref 21–32)
CREAT SERPL-MCNC: 1.5 MG/DL (ref 0.8–1.5)
DIFFERENTIAL METHOD BLD: ABNORMAL
EOSINOPHIL # BLD: 0.1 K/UL (ref 0–0.8)
EOSINOPHIL NFR BLD: 3 % (ref 0.5–7.8)
ERYTHROCYTE [DISTWIDTH] IN BLOOD BY AUTOMATED COUNT: 12.4 % (ref 11.9–14.6)
GLOBULIN SER CALC-MCNC: 3.1 G/DL (ref 2.3–3.5)
GLUCOSE SERPL-MCNC: 86 MG/DL (ref 65–100)
HCT VFR BLD AUTO: 39.6 % (ref 41.1–50.3)
HGB BLD-MCNC: 13.5 G/DL (ref 13.6–17.2)
IMM GRANULOCYTES # BLD AUTO: 0 K/UL (ref 0–0.5)
IMM GRANULOCYTES NFR BLD AUTO: 0 % (ref 0–5)
LYMPHOCYTES # BLD: 0.7 K/UL (ref 0.5–4.6)
LYMPHOCYTES NFR BLD: 23 % (ref 13–44)
MCH RBC QN AUTO: 31.8 PG (ref 26.1–32.9)
MCHC RBC AUTO-ENTMCNC: 34.1 G/DL (ref 31.4–35)
MCV RBC AUTO: 93.2 FL (ref 79.6–97.8)
MONOCYTES # BLD: 0.2 K/UL (ref 0.1–1.3)
MONOCYTES NFR BLD: 8 % (ref 4–12)
NEUTS SEG # BLD: 1.8 K/UL (ref 1.7–8.2)
NEUTS SEG NFR BLD: 65 % (ref 43–78)
NRBC # BLD: 0 K/UL (ref 0–0.2)
PLATELET # BLD AUTO: 172 K/UL (ref 150–450)
PMV BLD AUTO: 9.8 FL (ref 9.4–12.3)
POTASSIUM SERPL-SCNC: 4.1 MMOL/L (ref 3.5–5.1)
PROT SERPL-MCNC: 6.5 G/DL (ref 6.3–8.2)
RBC # BLD AUTO: 4.25 M/UL (ref 4.23–5.67)
SODIUM SERPL-SCNC: 141 MMOL/L (ref 136–145)
WBC # BLD AUTO: 2.8 K/UL (ref 4.3–11.1)

## 2020-08-06 PROCEDURE — 36415 COLL VENOUS BLD VENIPUNCTURE: CPT

## 2020-08-06 PROCEDURE — 85025 COMPLETE CBC W/AUTO DIFF WBC: CPT

## 2020-08-06 PROCEDURE — 80053 COMPREHEN METABOLIC PANEL: CPT

## 2020-12-02 ENCOUNTER — APPOINTMENT (RX ONLY)
Dept: URBAN - METROPOLITAN AREA CLINIC 349 | Facility: CLINIC | Age: 65
Setting detail: DERMATOLOGY
End: 2020-12-02

## 2020-12-02 DIAGNOSIS — L57.0 ACTINIC KERATOSIS: ICD-10-CM

## 2020-12-02 DIAGNOSIS — L81.4 OTHER MELANIN HYPERPIGMENTATION: ICD-10-CM

## 2020-12-02 DIAGNOSIS — D22 MELANOCYTIC NEVI: ICD-10-CM

## 2020-12-02 DIAGNOSIS — L82.1 OTHER SEBORRHEIC KERATOSIS: ICD-10-CM

## 2020-12-02 PROBLEM — D22.5 MELANOCYTIC NEVI OF TRUNK: Status: ACTIVE | Noted: 2020-12-02

## 2020-12-02 PROCEDURE — ? LIQUID NITROGEN

## 2020-12-02 PROCEDURE — 99214 OFFICE O/P EST MOD 30 MIN: CPT | Mod: 25

## 2020-12-02 PROCEDURE — 17000 DESTRUCT PREMALG LESION: CPT

## 2020-12-02 PROCEDURE — ? COUNSELING

## 2020-12-02 ASSESSMENT — LOCATION ZONE DERM
LOCATION ZONE: LEG
LOCATION ZONE: EAR
LOCATION ZONE: TRUNK

## 2020-12-02 ASSESSMENT — LOCATION DETAILED DESCRIPTION DERM
LOCATION DETAILED: LEFT INFERIOR MEDIAL UPPER BACK
LOCATION DETAILED: LEFT SUPERIOR UPPER BACK
LOCATION DETAILED: LEFT MEDIAL SUPERIOR CHEST
LOCATION DETAILED: LEFT INFERIOR CRUS OF ANTIHELIX
LOCATION DETAILED: LEFT LATERAL DISTAL PRETIBIAL REGION
LOCATION DETAILED: LEFT LATERAL SUPERIOR CHEST

## 2020-12-02 ASSESSMENT — LOCATION SIMPLE DESCRIPTION DERM
LOCATION SIMPLE: LEFT EAR
LOCATION SIMPLE: CHEST
LOCATION SIMPLE: LEFT UPPER BACK
LOCATION SIMPLE: LEFT PRETIBIAL REGION

## 2020-12-02 NOTE — PROCEDURE: LIQUID NITROGEN
Number Of Freeze-Thaw Cycles: 2 freeze-thaw cycles
Post-Care Instructions: I reviewed with the patient in detail post-care instructions. Patient is to wear sunprotection, and avoid picking at any of the treated lesions. Pt may apply Vaseline to crusted or scabbing areas.
Consent: The patient's consent was obtained including but not limited to risks of crusting, scabbing, blistering, scarring, darker or lighter pigmentary change, recurrence, incomplete removal and infection.
Render Note In Bullet Format When Appropriate: No
Duration Of Freeze Thaw-Cycle (Seconds): 2
Detail Level: Detailed

## 2021-03-05 ENCOUNTER — TRANSCRIBE ORDER (OUTPATIENT)
Dept: SCHEDULING | Age: 66
End: 2021-03-05

## 2021-03-05 DIAGNOSIS — R13.19 ESOPHAGEAL DYSPHAGIA: Primary | ICD-10-CM

## 2021-04-02 ENCOUNTER — HOSPITAL ENCOUNTER (OUTPATIENT)
Dept: GENERAL RADIOLOGY | Age: 66
Discharge: HOME | End: 2021-04-02
Payer: MEDICARE

## 2021-04-02 DIAGNOSIS — R13.19 ESOPHAGEAL DYSPHAGIA: ICD-10-CM

## 2021-04-02 PROCEDURE — 74220 X-RAY XM ESOPHAGUS 1CNTRST: CPT

## 2021-04-02 PROCEDURE — 74011000636 HC RX REV CODE- 636: Performed by: FAMILY MEDICINE

## 2021-04-02 PROCEDURE — 74011000250 HC RX REV CODE- 250: Performed by: FAMILY MEDICINE

## 2021-04-02 RX ADMIN — BARIUM SULFATE 135 ML: 980 POWDER, FOR SUSPENSION ORAL at 09:27

## 2021-04-02 RX ADMIN — ANTACID/ANTIFLATULENT 4 G: 380; 550; 10; 10 GRANULE, EFFERVESCENT ORAL at 09:27

## 2021-04-02 RX ADMIN — BARIUM SULFATE 355 ML: 0.6 SUSPENSION ORAL at 09:27

## 2021-04-02 RX ADMIN — DIATRIZOATE MEGLUMINE AND DIATRIZOATE SODIUM 30 ML: 660; 100 LIQUID ORAL; RECTAL at 09:28

## 2021-04-15 ENCOUNTER — HOSPITAL ENCOUNTER (OUTPATIENT)
Dept: GENERAL RADIOLOGY | Age: 66
Discharge: HOME | End: 2021-04-15
Payer: MEDICARE

## 2021-04-15 DIAGNOSIS — R13.13 PHARYNGEAL DYSPHAGIA: ICD-10-CM

## 2021-04-15 DIAGNOSIS — R13.10 DYSPHAGIA: ICD-10-CM

## 2021-04-15 PROCEDURE — 74011000250 HC RX REV CODE- 250: Performed by: INTERNAL MEDICINE

## 2021-04-15 PROCEDURE — 74230 X-RAY XM SWLNG FUNCJ C+: CPT

## 2021-04-15 PROCEDURE — 92611 MOTION FLUOROSCOPY/SWALLOW: CPT

## 2021-04-15 RX ADMIN — BARIUM SULFATE 45 ML: 980 POWDER, FOR SUSPENSION ORAL at 09:09

## 2021-04-15 RX ADMIN — BARIUM SULFATE 30 ML: 400 SUSPENSION ORAL at 09:10

## 2021-04-15 RX ADMIN — BARIUM SULFATE 15 ML: 400 PASTE ORAL at 09:10

## 2021-04-15 NOTE — THERAPY EVALUATION
Moo Salamanca Sr.  : 1955  [place]  Secondary: [place] [employer] [address] [facility], [facility], [address]  [phone]   [phone]        OUTPATIENT SPEECH LANGUAGE PATHOLOGY: MODIFIED BARIUM SWALLOW    ICD-10: Treatment Diagnosis: dysphagia, pharyngeal R 13.13  DATE: 4/15/2021  REFERRING PHYSICIAN: Rhys Spencer MD MD Orders: modified barium swallow study   PAST MEDICAL HISTORY:    Mr. Irina Levine is a 77 y.o. 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 He also  has a past surgical history that includes hx cervical laminectomy (); hx cervical fusion (2015); pr cardiac surg procedure unlist (); hx heart catheterization (); hx vascular access (2015); hx gi (2015); hx heent (2015); and hx colonoscopy (). MEDICAL/REFERRING DIAGNOSIS: Dysphagia [R13.10]  DATE OF ONSET:    PRIOR LEVEL OF FUNCTION: with spouse  PRECAUTIONS/ALLERGIES: Adhesive, Codeine, Lortab [hydrocodone-acetaminophen], and Prednisone   ASSESSMENT/PLAN OF CARE:  Pt with a hx of lingual cancer in .   Had a barium swallow study in March which yielded the following results: \"Laryngeal penetration without aspiration on swallowing attempts with a large volume of residual which slowly improved however does not completely resolve after several additional dry swallows. Patient may benefit from a modified barium swallow with speech language pathologist.\"  Pt reported he has to swallow hard to get food and sometimes liquids down. He reported if the food has any kind of consistency to it it will get stuck requiring him to cough it back up. He reported putting ketchup or condiments on his food helps it go down. He reported consuming po is tiring and not enjoyable. He reported he has had difficulties with maintaining weight due to fatigue with increased mastication time required. Therefore, he has started pureeing his foods which is helpful. He also reported he drinks Ensure and milkshakes. He reported chilled Ensure and tomato juice seem to go down better than other liquids. He reported he either takes liquid medication or crushes pills. He can take small pills whole. He reported he has had esophageal dilatation completed twice in the last 6 months. Based on the objective data described below, the patient presents with moderate pharyngeal dysphagia characterized by absent epiglottic inversion, significantly decreased pharyngeal contraction and decreased UES opening. Swallowing was elicited at the base of tongue with all consistencies. Pt with penetration during the swallow with thin liquids via spoon and cup, one trial of nectar thick liquids and with mixed consistencies. No penetration with thin liquids via straw, pudding or solids. Pt with spontaneous throat clears and double swallows in response to penetration which were successful in clearing the laryngeal vestibule. Pt with mild to moderate vallecular residue depending upon consistency tested. Pyriform sinus residue ranged from mild to severe depending upon consistency tested. Pt with multiple swallows with all consistencies in attempts to clear residue. He was unable to clear it but it did eventually decrease with multiple swallows.   Cued effortful swallows were ineffective in reducing residue. A chin tuck after pudding minimally decreased residue. However, a thin liquid wash after pudding did decrease residue from severe to mod. Recommend continuing with current diet with thin liquids via straw. Clear throat and double swallow with thin liquids which pt spontaneously does. Continue with multiple swallows per trial.  Alternate solids/liquids. Recommend ST to address deficits. Results/recommendations discussed with pt whom is in agreement. Orders to be requested. RECOMMENDATIONS AND PLANNED INTERVENTIONS (Benefits and precautions of therapy have been discussed with the patient.):  · PO:  Feel pt can tolerate a [facility] soft diet based on study but recommend pureed due to difficulty with maintaining weight with chewable textures  · Liquids:  regular thin via straw  · Clear throat and double swallow with liquids  MEDICATIONS:  · Crushed in puree  · whole in pureed   · Liquid medication when applicable  COMPENSATORY STRATEGIES/MODIFICATIONS INCLUDING:  · Alternate liquids/solids  · Liquids via straw  · Double swallow  · Clear throat and double swallow after consuming liquids  OTHER RECOMMENDATIONS (including follow up treatment recommendations):   · Laryngeal exercises    Thank you for this referral,  Reina Deidre [place] MSP, CCC-SLP             SUBJECTIVE:  Pt cooperative. Present Symptoms: dysphagia s/p chemo and radiation for lingual cancer in 2015; increasing difficulties with po reccently      Current Dietary Status:  Pureed/thin   Radiologist: Dr. Sid Styles  History of reflux:  []YES     []NO      Reflux medication:  Social History/Home Situation: with spouse     Work/Activity History: working     OBJECTIVE:  Objective Measure:   Tool Used: National Outcomes Measurement System: Functional Communication Measures: SWALLOWING  Score:  Initial: 4 Most Recent: X (Date: -- )   Interpretation of Tool: This measure describes the change in functional communication status subsequent to speech-language pathology treatment of patients with dysphagia.  o Level 1:  Individual is not able to swallow anything safely by mouth. All nutrition and hydration is received through non-oral means (e.g., nasogastric tube, PEG). o Level 2: Individual is not able to swallow safely by mouth for nutrition and hydration, but may take some consistency with consistent maximal cues in therapy only. Alternative method of feeding required. o Level 3:  Alternative method of feeding required as individual takes less than 50% of nutrition and hydration by mouth, and/or swallowing is safe with consistent use of moderate cues to use compensatory strategies and/or requires maximum diet restriction. o Level 4:  Swallowing is safe, but usually requires moderate cues to use compensatory strategies, and/or the individual has moderate diet restrictions and/or still requires tube feeding and/or oral supplements. o Level 5:  Swallowing is safe with minimal diet restriction and/or occasionally requires minimal cueing to use compensatory strategies. The individual may occasionally self-cue. All nutrition and hydration needs are met by mouth at mealtime. o Level 6:  Swallowing is safe, and the individual eats and drinks independently and may rarely require minimal cueing. The individual usually self-cues when difficulty occurs. May need to avoid specific food items (e.g., popcorn and nuts), or require additional time (due to dysphagia). o Level 7: The individuals ability to eat independently is not limited by swallow function. Swallowing would be safe and efficient for all consistencies. Compensatory strategies are effectively used when needed.   Score Level 7 Level 6 Level 5 Level 4 Level 3 Level 2 Level 1   Modifier CH CI CJ CK CL CM CN       Cognitive/Communication Status:  Mental Status  Neurologic State: Alert    Oral Assessment:  Oral Assessment  Labial: No impairment  Dentition: Intact, Natural  Oral Hygiene: adequate  Lingual: Other (comment)(decreased ROM with protrusion and lateralization)    Vocal Quality: no impairment    Patient Viewed: Patient Position: upright in chair  Film Views: Lateral, Fluoro    Oral Prepatory:  The patient was given the following: Consistency Presented: Mixed consistency, Nectar thick liquid, Pudding, Solid, Thin liquid  How Presented: Self-fed/presented, Cup/sip, Spoon, Straw    Oral Phase:  Bolus Acceptance: No impairment  Bolus Formation/Control: No impairment  Propulsion: No impairment     Oral Residue: None  Initiation of Swallow: No impairment  Oral Phase Severity: No impairment    Pharyngeal Phase:  Timing: No impairment  Decreased Tongue Base Retraction?: No  Laryngeal Elevation: Inadequate epiglottic inversion  Penetration: During swallow  Aspiration/Timing: No evidence of aspiration  Aspiration/Penetration Score: 3 (Penetration/Visible residue-Contrast remains above the folds/cords, but is not cleared)  Pharyngeal Symmetry: Not assessed  Pharyngeal Dysfunction: Decreased pharyngeal wall constriction  Pharyngeal Phase Severity: Moderate  Pharyngeal-Esophageal Segment: Decreased relaxation of upper esophageal segment    Assessment only;  No treatment(s) provided today  __________________________________________________________________________________________________  Recommendations for treatment: laryngeal exercises  Total Treatment Duration:  Time In: 0840   Time Out: [facility]., [facility]

## 2021-04-15 NOTE — PROGRESS NOTES
Outpatient Rehab Services  Referral Form/Physician Order  Central Scheduling Fax: [phone]  Speak to a :  Call [phone]   Please review and co-sign (electronically) OR sign and return to the below indicated clinic's fax number if you agree with this request.  Thank you! NAME:  Dominik Dwyer Sr. SSN:  [id number] :  1955   ADDRESS:  [address] [place] [address] Road [address] Daytime Phone: There is no home phone number on file. [phone] (mobile)    Diagnosis & Date of Onset:  Dysphagia, pharyngeal R13.13 Special Precautions:   Frequency:  [x] to be determined by therapist after evaluation   OR   ____ X per week X ____ weeks    Services    [x] Evaluate & Treat  [] Special Orders________________________   [] Physical Therapy  [] Occupational Therapy   [x] Speech Therapy  [] MBS w/ Speech Therapy    Specialized Programs    [] Aquatic Therapy [] Lymphedema [] Oncology Rehab   [] Balance Rehab [] Parkinson's Program [] Osteoporosis   [] Fibromyalgia [] Motion Analysis [] Spine Rehab   [] Industrial Rehab [] Hand & Upper Extremity [] Sports Injury Rehab    [] Urinary Incontinence     Locations    @ [address] [place] [address] 68, [address]  [place] [address] [address] [facility]  Ph: [phone]  Fax: [phone] @ [address] [place] [address] [address] [facility],[address]  [place], [address]  Ph: [phone]  Fax: [phone] @ [address] Mckeon, [address] [place] [address]  Ph: [phone]  Fax: [phone]        @ [address] [place] [address] Susana Em [address]  Ph: [phone]  Fax: [phone] @ [address] [place] [address], [facility] Gregory Mckeon, [address]   Ph: [phone]  Fax: [phone] @ Chris Lo   [address]  [place], [address]  Ph: [phone]  Fax: [phone]        @ [address]  Ph: [phone]  Fax: [phone] @ [address]  Cesar fernandes[address]  Ph: [phone]  Fax: [phone] @ [address], [address]  Verito Wolfe   Ph: [phone]         @ [address], [address] [place] [address]  Ph: [phone]  Fax: [phone]      This section is not needed if signing electronically   I certify that I have examined the above patient and outpatient rehab services are medically necessary.    ___________________________________________           Signature of Physician/Provider    ___________________________________________            Practice Name   ______________________   Date    ______________________   Referral Contact

## 2021-05-19 ENCOUNTER — HOSPITAL ENCOUNTER (OUTPATIENT)
Dept: PHYSICAL THERAPY | Age: 66
End: 2021-05-19

## 2021-05-31 NOTE — THERAPY EVALUATION
Gordon Martin Garcia. 
: 1955 [place] Secondary:  [facility], [address] [phone]   [phone] OUTPATIENT SPEECH LANGUAGE PATHOLOGY: Initial Assessment ICD-10: Treatment Diagnosis: dysphagia, pharyngeal R 13.13 
REFERRING PHYSICIAN: Essie Mitchell MD MD Orders: speech evaluate and treat PAST MEDICAL HISTORY:   
Mr. Alirio Benson is a 77 y.o. 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 He also  has a past surgical history that includes hx cervical laminectomy (); hx cervical fusion (2015); pr cardiac surg procedure unlist (); hx heart catheterization (); hx vascular access (2015); hx gi (2015); hx heent (2015); and hx colonoscopy (). MEDICAL/REFERRING DIAGNOSIS: Dysphagia, pharyngeal phase [R13.13] DATE OF ONSET:  PRIOR LEVEL OF FUNCTION: with spouse PRECAUTIONS/ALLERGIES: Adhesive, Codeine, Lortab [hydrocodone-acetaminophen], and Prednisone ASSESSMENT: 
Mr. Alirio Benson is a 76 y/o male referred to  due to dysphagia. 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   He had a MBS completed 4/15 with recommendations for a [facility] soft diet and thin liquids via straw with the use of the following compensatory strategies: alternate liquids/solids and clear throat and double swallow wtih liquids. Follow up ST was recommended. The pt reported this date he has been trying some things with his swallowing which he thinks has been helpful. He reported he has been cutting his foods up smaller, taking smaller sips and drinking more Ensure. He said Boost is more watery than Ensure which is harder to swallow. He reported he is trying to consume 3000 calories daily. He said he isn't really using straws with liquids. Reminded him of recommendation for straws with liquids on the MBS. He also blends his food if he has time or feels like doing it. He reported there are things he can eat easier such as loaded potato salad, dark meat chicken salad and mashed potatoes. He reported he gets over whelmed at restaurants because of the amount of food given to him. He reported he knows it would take way too long for him to eat it. Therefore, he asks for a to go box immediately to save the majority of his meal and to give him a small, manageable portion for him to eat. He reported that has been working well for him. He said he is consuming tomato juice mixed with V8 for breakfast.   
 
Based on the objective data described below, the patient presents with dysphagia. An oral motor evaluation revealed left lingual deviation upon protrusion due to hx of radiation and lingual cancer. Decreased ROM with lateralization and elevation noted also. Pt given trials thin liquids via cup and straw, mixed consistencies and solids. A throat clear was observed in response to the first trial of thin liquids via cup. A throat clear was later noted with one trial of thin via straw. No signs/sx aspiration observed with other consistencies. Began laryngeal exercises with a handout provided for home practice. Patient will benefit from skilled intervention to address the below impairments. ?????? ? ? This section established at most recent assessment?????????? 
PROBLEM LIST (Impairments causing functional limitations): 1. Dysphagia GOALS: (Goals have been discussed and agreed upon with patient.) SHORT-TERM FUNCTIONAL GOALS: Time Frame: 3 months Pt will complete laryngeal exercises with 80% accuracy. Pt will complete exercises at home a min of 5 days weekly. Pt will state compensatory strategies for po (alternate solids/liquids, use straws with liquids, periodically clear throat and double swallow with liquids) with only min cues needed. Pt will use compensatory strategies with po with only min cues needed. DISCHARGE GOALS: Time Frame: 4-5 months 1. Pt will tolerate least restrictive diet without signs/sx aspiration 100% for safe swallow function. REHABILITATION POTENTIAL FOR STATED GOALS: GoodPLAN OF CARE: 
Patient will benefit from skilled intervention to address the following impairments. RECOMMENDATIONS AND PLANNED INTERVENTIONS (Benefits and precautions of therapy have been discussed with the patient.): 
· PO:  Mechanical soft with chopped meat and vegetables · Liquids:  regular thin · use straws for liquids MEDICATIONS: 
· Crushed in puree · whole in pureed · Liquid medication when applicable COMPENSATORY STRATEGIES/MODIFICATIONS INCLUDING: 
· Alternate liquids/solids · Use straws with liquids · Clear throat and double swallow with liquids OTHER RECOMMENDATIONS (including follow up treatment recommendations):  
· Laryngeal exercises RECOMMENDED DIET MODIFICATIONS DISCUSSED WITH: 
· Patient TREATMENT PLAN EFFECTIVE DATES: 6/1/2021 TO 8/30/2021 (90 days). FREQUENCY/DURATION: Continue to follow patient 2 times a week for 90 days to address above goals. Regarding Rhiannon Good Sr.'s therapy, I certify that the treatment plan above will be carried out by a therapist or under their direction. Thank you for this referral, 
[address] [address] [facility]  Referring Physician Signature: Dorothea Blankenship, MD    Date SUBJECTIVE: 
Pt cooperative. Present Symptoms: foods not going down Current Medications:  
Current Outpatient Medications:  
  levothyroxine (SYNTHROID) 75 mcg tablet, Take 1 Tab by mouth Daily (before breakfast). , Disp: 90 Tab, Rfl: 1 
  atorvastatin (LIPITOR) 10 mg tablet, Take 1 Tab by mouth daily. , Disp: 90 Tab, Rfl: 1 Date Last Reviewed: 6/1/21 Current Dietary Status:  [facility] soft/thin liquids; pureed foods at times History of reflux:  NO  
 Reflux medication: 
Social History/Home Situation: with spouse Work/Activity History: working full time OBJECTIVE: 
Objective Measure: Tool Used: National Outcomes Measurement System: Functional Communication Measures: SWALLOWING Score:  Initial: 4 Most Recent: X (Date: -- ) Interpretation of Tool: This measure describes the change in functional communication status subsequent to speech-language pathology treatment of patients with dysphagia. o Level 1:  Individual is not able to swallow anything safely by mouth. All nutrition and hydration is received through non-oral means (e.g., nasogastric tube, PEG). o Level 2: Individual is not able to swallow safely by mouth for nutrition and hydration, but may take some consistency with consistent maximal cues in therapy only. Alternative method of feeding required. o Level 3:  Alternative method of feeding required as individual takes less than 50% of nutrition and hydration by mouth, and/or swallowing is safe with consistent use of moderate cues to use compensatory strategies and/or requires maximum diet restriction. o Level 4:  Swallowing is safe, but usually requires moderate cues to use compensatory strategies, and/or the individual has moderate diet restrictions and/or still requires tube feeding and/or oral supplements. o Level 5:  Swallowing is safe with minimal diet restriction and/or occasionally requires minimal cueing to use compensatory strategies.  The individual may occasionally self-cue. All nutrition and hydration needs are met by mouth at mealtime. o Level 6:  Swallowing is safe, and the individual eats and drinks independently and may rarely require minimal cueing. The individual usually self-cues when difficulty occurs. May need to avoid specific food items (e.g., popcorn and nuts), or require additional time (due to dysphagia). o Level 7: The individuals ability to eat independently is not limited by swallow function. Swallowing would be safe and efficient for all consistencies. Compensatory strategies are effectively used when needed. Score Level 7 Level 6 Level 5 Level 4 Level 3 Level 2 Level 1 Modifier CH CI CJ CK CL CM CN Oral Motor Structure/Speech:  Oral-Motor Structure/Motor Speech Labial: No impairment Dentition: Intact, Natural 
Oral Hygiene: adequate Lingual: Left deviation (due to hx of lingual cancer and radiation in 2015) Cognitive and Communication Status: 
Neurologic State: Alert BEDSIDE SWALLOW EVALUATION Oral Assessment: 
Oral Assessment Labial: No impairment Dentition: Intact; Natural 
Oral Hygiene: adequate Lingual: Left deviation (due to hx of lingual cancer and radiation in 2015) P. O. Trials: 
Patient Position: upright in chair The patient was given teaspoon to straw amounts of the following:  
Consistency Presented: Mixed consistency; Solid; Thin liquid How Presented: Self-fed/presented;Cup/sip;Spoon;Straw ORAL PHASE: 
Bolus Acceptance: No impairment Bolus Formation/Control: No impairment Propulsion: No impairment Oral Residue: None PHARYNGEAL PHASE: 
Initiation of Swallow: No impairment Laryngeal Elevation: Functional 
Aspiration Signs/Symptoms: Clear throat Vocal Quality: No impairment OTHER OBSERVATIONS: 
Rate/bite size: WNL Endurance:  Questionable TREATMENT:  
 (In addition to Assessment/Re-Assessment sessions the following treatments were rendered) Assessment only;  No treatment(s) provided today LARYNGEAL / PHARYNGEAL EXERCISES: 
  
  
  
Effortful Swallow: Yes Reps : 10 Sets : 1 January: Yes Reps : 10 Sets : 1 Shaker: Yes Reps :  (3 sets of 5 seconds sustained and 3 sets of 5 repetitions) Sets : 1 CTAR: 3 sets of 30 seconds 
  
  
  
  
  
  
  
  
  
  
  
   
 
__________________________________________________________________________________________________ Treatment Assessment:   . Progression/Medical Necessity:  
· Skilled intervention continues to be required due to persistent signs and symptoms of aspiration and patient still consuming a modified diet. Compliance with Program/Exercises: Will assess as treatment progresses. Reason for Continuation of Services/Other Comments: 
· Patient continues to require skilled intervention due to dysphagia. Recommendations/Intent for next treatment session: \"Treatment next visit will focus on laryngeal exercises\". Total Treatment Duration: 
Time In: 8046 Time Out: 1115 [address] [facility] [address] [facility] Visit Approval Visit # Therapist initials Date A NS / Cx < 24 hr >24 hr Cx Comments 1 RL 6/1 [x]  [] [] Initial evaluation  
    [] [] []   

## 2021-06-01 ENCOUNTER — HOSPITAL ENCOUNTER (OUTPATIENT)
Dept: PHYSICAL THERAPY | Age: 66
Discharge: HOME | End: 2021-06-01
Payer: MEDICARE

## 2021-06-01 PROCEDURE — 92610 EVALUATE SWALLOWING FUNCTION: CPT

## 2021-06-02 ENCOUNTER — APPOINTMENT (RX ONLY)
Dept: URBAN - METROPOLITAN AREA CLINIC 349 | Facility: CLINIC | Age: 66
Setting detail: DERMATOLOGY
End: 2021-06-02

## 2021-06-02 DIAGNOSIS — Z85.828 PERSONAL HISTORY OF OTHER MALIGNANT NEOPLASM OF SKIN: ICD-10-CM

## 2021-06-02 DIAGNOSIS — D22 MELANOCYTIC NEVI: ICD-10-CM

## 2021-06-02 DIAGNOSIS — L81.4 OTHER MELANIN HYPERPIGMENTATION: ICD-10-CM

## 2021-06-02 PROBLEM — D22.72 MELANOCYTIC NEVI OF LEFT LOWER LIMB, INCLUDING HIP: Status: ACTIVE | Noted: 2021-06-02

## 2021-06-02 PROBLEM — D22.5 MELANOCYTIC NEVI OF TRUNK: Status: ACTIVE | Noted: 2021-06-02

## 2021-06-02 PROBLEM — D22.62 MELANOCYTIC NEVI OF LEFT UPPER LIMB, INCLUDING SHOULDER: Status: ACTIVE | Noted: 2021-06-02

## 2021-06-02 PROBLEM — C44.229 SQUAMOUS CELL CARCINOMA OF SKIN OF LEFT EAR AND EXTERNAL AURICULAR CANAL: Status: ACTIVE | Noted: 2021-06-02

## 2021-06-02 PROCEDURE — ? MEDICAL PHOTOGRAPHY REVIEW

## 2021-06-02 PROCEDURE — ? COUNSELING

## 2021-06-02 PROCEDURE — ? PATHOLOGY BILLING

## 2021-06-02 PROCEDURE — 99213 OFFICE O/P EST LOW 20 MIN: CPT | Mod: 25

## 2021-06-02 PROCEDURE — 88305 TISSUE EXAM BY PATHOLOGIST: CPT

## 2021-06-02 PROCEDURE — ? BIOPSY BY SHAVE METHOD

## 2021-06-02 PROCEDURE — 69100 BIOPSY OF EXTERNAL EAR: CPT

## 2021-06-02 PROCEDURE — A4550 SURGICAL TRAYS: HCPCS

## 2021-06-02 PROCEDURE — ? BODY PHOTOGRAPHY

## 2021-06-02 ASSESSMENT — LOCATION DETAILED DESCRIPTION DERM
LOCATION DETAILED: RIGHT LATERAL SUPERIOR CHEST
LOCATION DETAILED: LEFT ANTERIOR SHOULDER
LOCATION DETAILED: RIGHT SUPERIOR MEDIAL UPPER BACK
LOCATION DETAILED: RIGHT MEDIAL UPPER BACK
LOCATION DETAILED: LEFT SUPERIOR UPPER BACK
LOCATION DETAILED: LEFT MEDIAL SUPERIOR CHEST
LOCATION DETAILED: RIGHT SUPERIOR MEDIAL UPPER BACK
LOCATION DETAILED: LEFT SUPERIOR UPPER BACK
LOCATION DETAILED: RIGHT ANTERIOR DISTAL THIGH
LOCATION DETAILED: LEFT PROXIMAL PRETIBIAL REGION
LOCATION DETAILED: RIGHT LATERAL SUPERIOR CHEST
LOCATION DETAILED: LEFT FOREHEAD

## 2021-06-02 ASSESSMENT — LOCATION SIMPLE DESCRIPTION DERM
LOCATION SIMPLE: RIGHT UPPER BACK
LOCATION SIMPLE: RIGHT UPPER BACK
LOCATION SIMPLE: LEFT PRETIBIAL REGION
LOCATION SIMPLE: LEFT UPPER BACK
LOCATION SIMPLE: LEFT SHOULDER
LOCATION SIMPLE: CHEST
LOCATION SIMPLE: LEFT FOREHEAD
LOCATION SIMPLE: CHEST
LOCATION SIMPLE: LEFT UPPER BACK
LOCATION SIMPLE: RIGHT THIGH

## 2021-06-02 ASSESSMENT — LOCATION ZONE DERM
LOCATION ZONE: LEG
LOCATION ZONE: TRUNK
LOCATION ZONE: TRUNK
LOCATION ZONE: ARM
LOCATION ZONE: FACE

## 2021-06-02 NOTE — PROCEDURE: PATHOLOGY BILLING
Immunohistochemistry ([id number] and [id number]) billing is not performed here. Please use the Immunohistochemistry Stain Billing plan to accomplish this. Immunohistochemistry ([id number] and [id number]) billing is not performed here. Please use the Immunohistochemistry Stain Billing plan to accomplish this.

## 2021-06-02 NOTE — PROCEDURE: MEDICAL PHOTOGRAPHY REVIEW
Detail Level: Generalized
Review Findings: no new or changing lesions
Number Of Photographs Reviewed: 13

## 2021-06-02 NOTE — PROCEDURE: BIOPSY BY SHAVE METHOD
Bill [id number] For Specimen Handling/Conveyance To Laboratory?: no
Depth Of Biopsy: dermis
Anesthesia Volume In Cc (Will Not Render If 0): 0.5
Destruction After The Procedure: Yes
Hemostasis: Aluminum Chloride
Information: Selecting Yes will display possible errors in your note based on the variables you have selected. This validation is only offered as a suggestion for you. PLEASE NOTE THAT THE VALIDATION TEXT WILL BE REMOVED WHEN YOU FINALIZE YOUR NOTE. IF YOU WANT TO FAX A PRELIMINARY NOTE YOU WILL NEED TO TOGGLE THIS TO 'NO' IF YOU DO NOT WANT IT IN YOUR FAXED NOTE.
Additional Anesthesia Volume In Cc (Will Not Render If 0): 0
Billing Type: Third-Party Bill
Accession #: [id number]
Electrodesiccation Text: The wound bed was treated with electrodesiccation after the biopsy was performed.
Wound Care: Vaseline
Biopsy Type: H and E
Type Of Destruction Used: Electrodesiccation
Consent: Written consent was obtained and risks were reviewed including but not limited to scarring, infection, bleeding, scabbing, incomplete removal, nerve damage and allergy to anesthesia.
Cryotherapy Text: The wound bed was treated with cryotherapy after the biopsy was performed.
Detail Level: Detailed
Notification Instructions: Patient will be notified of biopsy results. However, patient instructed to call the office if not contacted within 2 weeks.
Biopsy Method: Personna blade
Dressing: bandage
Anesthesia Type: 1% lidocaine with 1:500,000 epinephrine and a 1:10 solution of 8.4% sodium bicarbonate
Post-Care Instructions: I reviewed with the patient in detail post-care instructions. Patient is to keep the biopsy site dry overnight, and then apply bacitracin twice daily until healed. Patient may apply hydrogen peroxide soaks to remove any crusting.\\nAft the procedure, the patient was observed for 5-10 minutes and was oriented to person, place, and time.  Denied feeling dizzy, queasy, and stated that they did not feel that they were going to faint.

## 2021-06-02 NOTE — PROCEDURE: BODY PHOTOGRAPHY
Detail Level: Generalized
Was The Entire Body Photographed (Cannot Bill Unless Entire Body Photographed)?: Please Select Yes or No - If you select Yes you are confirming that you took full body photographs
Consent: Written consent obtained, risks reviewed for whole body photography. Patient understands that photograph costs may not be covered by insurance, and patient is ultimately responsible for payment.
Number Of Photographs (Optional- Will Not Render If 0): 13
Whole Body Statement: The whole body was photographed today.

## 2021-06-04 ENCOUNTER — HOSPITAL ENCOUNTER (OUTPATIENT)
Dept: PHYSICAL THERAPY | Age: 66
Discharge: HOME | End: 2021-06-04
Payer: MEDICARE

## 2021-06-04 PROCEDURE — 92526 ORAL FUNCTION THERAPY: CPT

## 2021-06-04 NOTE — PROGRESS NOTES
Marlin Mckinnon Sr.  : 1955  [place]  Secondary:  [address]  at [facility] [address], [address] [facility], [address]  [phone]   [phone]        OUTPATIENT SPEECH LANGUAGE PATHOLOGY: Daily Note: 1  ICD-10: Treatment Diagnosis: dysphagia, pharyngeal R 13.13  REFERRING PHYSICIAN: Amy Mercer MD MD Orders: speech evaluate and treat  PAST MEDICAL HISTORY:    Mr. Donna Lockwood is a 77 y.o. 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 He also  has a past surgical history that includes hx cervical laminectomy (); hx cervical fusion (2015); pr cardiac surg procedure unlist (); hx heart catheterization (); hx vascular access (2015); hx gi (2015); hx heent (2015); and hx colonoscopy (). MEDICAL/REFERRING DIAGNOSIS: Dysphagia, pharyngeal phase [R13.13]  DATE OF ONSET:    PRIOR LEVEL OF FUNCTION: with spouse  PRECAUTIONS/ALLERGIES: Adhesive, Codeine, Lortab [hydrocodone-acetaminophen], and Prednisone    ASSESSMENT:  Pt with increased ability to complete the Shaker this date as less shoulder movement noted. Increased duration also. Pt with coughing at times with liquids during the session. Patient will benefit from skilled intervention to address the below impairments. ?????? ? ? This section established at most recent assessment??????????  PROBLEM LIST (Impairments causing functional limitations):  1. Dysphagia   GOALS: (Goals have been discussed and agreed upon with patient.)  SHORT-TERM FUNCTIONAL GOALS: Time Frame: 3 months   Pt will complete laryngeal exercises with 80% accuracy. Pt will complete exercises at home a min of 5 days weekly. Pt will state compensatory strategies for po (alternate solids/liquids, use straws with liquids, periodically clear throat and double swallow with liquids) with only min cues needed. Pt will use compensatory strategies with po with only min cues needed. DISCHARGE GOALS: Time Frame: 4-5 months   1. Pt will tolerate least restrictive diet without signs/sx aspiration 100% for safe swallow function. REHABILITATION POTENTIAL FOR STATED GOALS: GoodPLAN OF CARE:  Patient will benefit from skilled intervention to address the following impairments. RECOMMENDATIONS AND PLANNED INTERVENTIONS (Benefits and precautions of therapy have been discussed with the patient.):  · PO:  Mechanical soft with chopped meat and vegetables  · Liquids:  regular thin  · use straws for liquids  MEDICATIONS:  · Crushed in puree  · whole in pureed   · Liquid medication when applicable  COMPENSATORY STRATEGIES/MODIFICATIONS INCLUDING:  · Alternate liquids/solids  · Use straws with liquids  · Clear throat and double swallow with liquids  OTHER RECOMMENDATIONS (including follow up treatment recommendations):   · Laryngeal exercises  RECOMMENDED DIET MODIFICATIONS DISCUSSED WITH:  · Patient  TREATMENT PLAN EFFECTIVE DATES: 6/1/2021 TO 8/30/2021 (90 days). FREQUENCY/DURATION: Continue to follow patient 2 times a week for 90 days to address above goals. Regarding Rich Edouard Sr.'s therapy, I certify that the treatment plan above will be carried out by a therapist or under their direction.   Thank you for this referral,  Karlee Rooney, [facility] MEDICO DEL [facility], [facility] [place], [address] [facility]                    Referring Physician Signature: Nely Amin MD    Date      SUBJECTIVE:  Pt cooperative. Present Symptoms: foods not going down      Current Medications:   Current Outpatient Medications:     levothyroxine (SYNTHROID) 75 mcg tablet, Take 1 Tab by mouth Daily (before breakfast). , Disp: 90 Tab, Rfl: 1    atorvastatin (LIPITOR) 10 mg tablet, Take 1 Tab by mouth daily. , Disp: 90 Tab, Rfl: 1   Date Last Reviewed: 6/1/21  Current Dietary Status:  [facility] soft/thin liquids; pureed foods at times      History of reflux:  NO    Reflux medication:  Social History/Home Situation: with spouse      Work/Activity History: working full time    OBJECTIVE:  Objective Measure: Tool Used: National Outcomes Measurement System: Functional Communication Measures: SWALLOWING  Score:  Initial: 4 Most Recent: X (Date: -- )   Interpretation of Tool: This measure describes the change in functional communication status subsequent to speech-language pathology treatment of patients with dysphagia.  o Level 1:  Individual is not able to swallow anything safely by mouth. All nutrition and hydration is received through non-oral means (e.g., nasogastric tube, PEG). o Level 2: Individual is not able to swallow safely by mouth for nutrition and hydration, but may take some consistency with consistent maximal cues in therapy only. Alternative method of feeding required. o Level 3:  Alternative method of feeding required as individual takes less than 50% of nutrition and hydration by mouth, and/or swallowing is safe with consistent use of moderate cues to use compensatory strategies and/or requires maximum diet restriction. o Level 4:  Swallowing is safe, but usually requires moderate cues to use compensatory strategies, and/or the individual has moderate diet restrictions and/or still requires tube feeding and/or oral supplements. o Level 5:  Swallowing is safe with minimal diet restriction and/or occasionally requires minimal cueing to use compensatory strategies.  The individual may occasionally self-cue. All nutrition and hydration needs are met by mouth at mealtime. o Level 6:  Swallowing is safe, and the individual eats and drinks independently and may rarely require minimal cueing. The individual usually self-cues when difficulty occurs. May need to avoid specific food items (e.g., popcorn and nuts), or require additional time (due to dysphagia). o Level 7: The individuals ability to eat independently is not limited by swallow function. Swallowing would be safe and efficient for all consistencies. Compensatory strategies are effectively used when needed. Score Level 7 Level 6 Level 5 Level 4 Level 3 Level 2 Level 1   Modifier CH CI CJ CK CL CM CN       Cognitive and Communication Status:  Mental status: alert    TREATMENT:    (In addition to Assessment/Re-Assessment sessions the following treatments were rendered)  Dysphagia Activities: Activities/Procedures listed utilized to improve progress in swallow function and swallow safety. Required minimal cueing to improve swallow safety. LARYNGEAL / PHARYNGEAL EXERCISES:           Effortful Swallow: Yes  Reps : 10  Sets : 1                                January: Yes  Reps : 10  Sets : 1  Mendelsohn Maneuver: Yes  Reps : 10  Sets : 1          Shaker: Yes  Reps :  (3 sets of 15 seconds; 3 sets of 5 repetitions;3 sets of CTAR)  Sets : 1                                 Tongue Back & Hold: Yes  Reps :  (0/5 as pt curled tongue vs kept it depressed)  Sets : 1       __________________________________________________________________________________________________  Treatment Assessment:   . Progression/Medical Necessity:   · Skilled intervention continues to be required due to persistent signs and symptoms of aspiration and patient still consuming a modified diet. Compliance with Program/Exercises: Will assess as treatment progresses.    Reason for Continuation of Services/Other Comments:  · Patient continues to require skilled intervention due to dysphagia. Recommendations/Intent for next treatment session: \"Treatment next visit will focus on laryngeal exercises\".      Total Treatment Duration:  Time In: 0935  Time Out: 1819 [facility], [facility], [facility] NIKITA AMANDA [place], [facility]    Visit Approval Visit # Therapist initials Date A NS / Cx < 24 hr >24 hr Cx Comments    1 RL 6/1 [x]  [] [] Initial evaluation    2 RL 6/4 [x] [] [] Tx       [] [] []        [] [] []        [] [] []        [] [] []        [] [] []        [] [] []        [] [] []        [] [] []        [] [] []        [] [] []        [] [] []        [] [] []        [] [] []        [] [] []        [] [] []        [] [] []

## 2021-06-08 ENCOUNTER — HOSPITAL ENCOUNTER (OUTPATIENT)
Dept: PHYSICAL THERAPY | Age: 66
Discharge: HOME | End: 2021-06-08
Payer: MEDICARE

## 2021-06-10 ENCOUNTER — HOSPITAL ENCOUNTER (OUTPATIENT)
Dept: PHYSICAL THERAPY | Age: 66
Discharge: HOME | End: 2021-06-10
Payer: MEDICARE

## 2021-06-10 PROCEDURE — 92526 ORAL FUNCTION THERAPY: CPT

## 2021-06-10 NOTE — PROGRESS NOTES
Evelyn Griffin Sr.  : 1955  [place]  Secondary:  [address]  at  66, [address] [facility], [address]  [phone]   [phone]        OUTPATIENT SPEECH LANGUAGE PATHOLOGY: Daily Note: 2  ICD-10: Treatment Diagnosis: dysphagia, pharyngeal R 13.13  REFERRING PHYSICIAN: Crissy Gonzalez MD MD Orders: speech evaluate and treat  PAST MEDICAL HISTORY:    Mr. Arnulfo Naidu is a 77 y.o. 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 He also  has a past surgical history that includes hx cervical laminectomy (); hx cervical fusion (2015); pr cardiac surg procedure unlist (); hx heart catheterization (); hx vascular access (2015); hx gi (2015); hx heent (2015); and hx colonoscopy (). MEDICAL/REFERRING DIAGNOSIS: Dysphagia, pharyngeal phase [R13.13]  DATE OF ONSET:    PRIOR LEVEL OF FUNCTION: with spouse  PRECAUTIONS/ALLERGIES: Adhesive, Codeine, Lortab [hydrocodone-acetaminophen], and Prednisone    ASSESSMENT:  Pt did well with exercises. He reported he feels his swallowing is better. However, he still has to clear his throat at times and pause when eating to let food go down completely. He also reported he may need to drink more frequently during po. Reminded him of suggestion to alternate solids/liquids.   He reported he may need to take 2 sips per every bite at times. He reported he will try and see what is most beneficial.  He did well with exercises this date and increased his duration with the Shaker. Patient will benefit from skilled intervention to address the below impairments. ?????? ? ? This section established at most recent assessment??????????  PROBLEM LIST (Impairments causing functional limitations):  1. Dysphagia   GOALS: (Goals have been discussed and agreed upon with patient.)  SHORT-TERM FUNCTIONAL GOALS: Time Frame: 3 months   Pt will complete laryngeal exercises with 80% accuracy. Pt will complete exercises at home a min of 5 days weekly. Pt will state compensatory strategies for po (alternate solids/liquids, use straws with liquids, periodically clear throat and double swallow with liquids) with only min cues needed. Pt will use compensatory strategies with po with only min cues needed. DISCHARGE GOALS: Time Frame: 4-5 months   1. Pt will tolerate least restrictive diet without signs/sx aspiration 100% for safe swallow function. REHABILITATION POTENTIAL FOR STATED GOALS: GoodPLAN OF CARE:  Patient will benefit from skilled intervention to address the following impairments. RECOMMENDATIONS AND PLANNED INTERVENTIONS (Benefits and precautions of therapy have been discussed with the patient.):  · PO:  Mechanical soft with chopped meat and vegetables  · Liquids:  regular thin  · use straws for liquids  MEDICATIONS:  · Crushed in puree  · whole in pureed   · Liquid medication when applicable  COMPENSATORY STRATEGIES/MODIFICATIONS INCLUDING:  · Alternate liquids/solids  · Use straws with liquids  · Clear throat and double swallow with liquids  OTHER RECOMMENDATIONS (including follow up treatment recommendations):   · Laryngeal exercises  RECOMMENDED DIET MODIFICATIONS DISCUSSED WITH:  · Patient  TREATMENT PLAN EFFECTIVE DATES: 6/1/2021 TO 8/30/2021 (90 days).   FREQUENCY/DURATION: Continue to follow patient 2 times a week for 90 days to address above goals. Regarding Mike Silverman Sr.'s therapy, I certify that the treatment plan above will be carried out by a therapist or under their direction. Thank you for this referral,  Brandon Del Angel, [facility] MEDICO [facility], [facility] N[facility] AMANDA DEMARCO, [address] [facility]                    Referring Physician Signature: Fabienne Tellez MD    Date      SUBJECTIVE:  Pt cooperative. Present Symptoms: foods not going down      Current Medications:   Current Outpatient Medications:     levothyroxine (SYNTHROID) 75 mcg tablet, Take 1 Tab by mouth Daily (before breakfast). , Disp: 90 Tab, Rfl: 1    atorvastatin (LIPITOR) 10 mg tablet, Take 1 Tab by mouth daily. , Disp: 90 Tab, Rfl: 1   Date Last Reviewed: 6/10/21  Current Dietary Status:  mech soft/thin liquids; pureed foods at times      History of reflux:  NO    Reflux medication:  Social History/Home Situation: with spouse      Work/Activity History: working full time    OBJECTIVE:  Objective Measure: Tool Used: National Outcomes Measurement System: Functional Communication Measures: SWALLOWING  Score:  Initial: 4 Most Recent: X (Date: -- )   Interpretation of Tool: This measure describes the change in functional communication status subsequent to speech-language pathology treatment of patients with dysphagia.  o Level 1:  Individual is not able to swallow anything safely by mouth. All nutrition and hydration is received through non-oral means (e.g., nasogastric tube, PEG). o Level 2: Individual is not able to swallow safely by mouth for nutrition and hydration, but may take some consistency with consistent maximal cues in therapy only. Alternative method of feeding required. o Level 3:  Alternative method of feeding required as individual takes less than 50% of nutrition and hydration by mouth, and/or swallowing is safe with consistent use of moderate cues to use compensatory strategies and/or requires maximum diet restriction.   o Level 4:  Swallowing is safe, but usually requires moderate cues to use compensatory strategies, and/or the individual has moderate diet restrictions and/or still requires tube feeding and/or oral supplements. o Level 5:  Swallowing is safe with minimal diet restriction and/or occasionally requires minimal cueing to use compensatory strategies. The individual may occasionally self-cue. All nutrition and hydration needs are met by mouth at mealtime. o Level 6:  Swallowing is safe, and the individual eats and drinks independently and may rarely require minimal cueing. The individual usually self-cues when difficulty occurs. May need to avoid specific food items (e.g., popcorn and nuts), or require additional time (due to dysphagia). o Level 7: The individuals ability to eat independently is not limited by swallow function. Swallowing would be safe and efficient for all consistencies. Compensatory strategies are effectively used when needed. Score Level 7 Level 6 Level 5 Level 4 Level 3 Level 2 Level 1   Modifier CH CI CJ CK CL CM CN       Cognitive and Communication Status:  Mental status: alert    TREATMENT:    (In addition to Assessment/Re-Assessment sessions the following treatments were rendered)  Dysphagia Activities: Activities/Procedures listed utilized to improve progress in swallow function and swallow safety. Required minimal cueing to improve swallow safety. LARYNGEAL / PHARYNGEAL EXERCISES:                                                                                                                                  __________________________________________________________________________________________________  Treatment Assessment:   . Progression/Medical Necessity:   · Skilled intervention continues to be required due to persistent signs and symptoms of aspiration and patient still consuming a modified diet. Compliance with Program/Exercises: Will assess as treatment progresses.    Reason for Continuation of Services/Other Comments:  · Patient continues to require skilled intervention due to dysphagia. Recommendations/Intent for next treatment session: \"Treatment next visit will focus on laryngeal exercises\".      Total Treatment Duration:  Time In: 1030  Time Out: [address] [place] [address], [facility] MEDICO [facility], [facility] AMANDA DEMARCO, [facility]    Visit Approval Visit # Therapist initials Date A NS / Cx < 24 hr >24 hr Cx Comments    1 RL 6/1 [x]  [] [] Initial evaluation    2 RL 6/4 [x] [] [] Tx     RL 6/8 [] [x] [] C/Cx    3 RL 6/10 [x] [] [] Tx       [] [] []        [] [] []        [] [] []        [] [] []        [] [] []        [] [] []        [] [] []        [] [] []        [] [] []        [] [] []        [] [] []        [] [] []        [] [] []        [] [] []

## 2021-06-15 ENCOUNTER — HOSPITAL ENCOUNTER (OUTPATIENT)
Dept: PHYSICAL THERAPY | Age: 66
Discharge: HOME | End: 2021-06-15
Payer: MEDICARE

## 2021-06-15 PROCEDURE — 92526 ORAL FUNCTION THERAPY: CPT

## 2021-06-15 NOTE — PROGRESS NOTES
Dorota Larsen Sr.  : 1955  [place]  Secondary:  [address]  [facility], [address]  [phone]   [phone]        OUTPATIENT SPEECH LANGUAGE PATHOLOGY: Daily Note: 3  ICD-10: Treatment Diagnosis: dysphagia, pharyngeal R 13.13  REFERRING PHYSICIAN: Cary Doan MD MD Orders: speech evaluate and treat  PAST MEDICAL HISTORY:    Mr. Rocky Bailey is a 77 y.o. 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 He also  has a past surgical history that includes hx cervical laminectomy (); hx cervical fusion (2015); pr cardiac surg procedure unlist (); hx heart catheterization (); hx vascular access (2015); hx gi (2015); hx heent (2015); and hx colonoscopy (). MEDICAL/REFERRING DIAGNOSIS: Dysphagia, pharyngeal phase [R13.13]  DATE OF ONSET:    PRIOR LEVEL OF FUNCTION: with spouse  PRECAUTIONS/ALLERGIES: Adhesive, Codeine, Lortab [hydrocodone-acetaminophen], and Prednisone    ASSESSMENT:  Pt did well with exercises. He reported he feels his swallowing is continuing to improve. He reported it isn't as much of a chore as it used to be. Therefore, he's cooking more foods now. Pt with several throat clears during the session without po.     Patient will benefit from skilled intervention to address the below impairments. ?????? ? ? This section established at most recent assessment??????????  PROBLEM LIST (Impairments causing functional limitations):  1. Dysphagia   GOALS: (Goals have been discussed and agreed upon with patient.)  SHORT-TERM FUNCTIONAL GOALS: Time Frame: 3 months   Pt will complete laryngeal exercises with 80% accuracy. Pt will complete exercises at home a min of 5 days weekly. Pt will state compensatory strategies for po (alternate solids/liquids, use straws with liquids, periodically clear throat and double swallow with liquids) with only min cues needed. Pt will use compensatory strategies with po with only min cues needed. DISCHARGE GOALS: Time Frame: 4-5 months   1. Pt will tolerate least restrictive diet without signs/sx aspiration 100% for safe swallow function. REHABILITATION POTENTIAL FOR STATED GOALS: GoodPLAN OF CARE:  Patient will benefit from skilled intervention to address the following impairments. RECOMMENDATIONS AND PLANNED INTERVENTIONS (Benefits and precautions of therapy have been discussed with the patient.):  · PO:  Mechanical soft with chopped meat and vegetables  · Liquids:  regular thin  · use straws for liquids  MEDICATIONS:  · Crushed in puree  · whole in pureed   · Liquid medication when applicable  COMPENSATORY STRATEGIES/MODIFICATIONS INCLUDING:  · Alternate liquids/solids  · Use straws with liquids  · Clear throat and double swallow with liquids  OTHER RECOMMENDATIONS (including follow up treatment recommendations):   · Laryngeal exercises  RECOMMENDED DIET MODIFICATIONS DISCUSSED WITH:  · Patient  TREATMENT PLAN EFFECTIVE DATES: 6/1/2021 TO 8/30/2021 (90 days). FREQUENCY/DURATION: Continue to follow patient 2 times a week for 90 days to address above goals. Regarding Arnulfo Merchant Sr.'s therapy, I certify that the treatment plan above will be carried out by a therapist or under their direction.   Thank you for this referral,  Pino Roger [facility], [facility] AMANDA DEMARCO [facility]                    Referring Physician Signature: Crissy Gonzalez MD    Date      SUBJECTIVE:  Pt cooperative. Present Symptoms: foods not going down      Current Medications:   Current Outpatient Medications:     levothyroxine (SYNTHROID) 75 mcg tablet, Take 1 Tab by mouth Daily (before breakfast). , Disp: 90 Tab, Rfl: 1    atorvastatin (LIPITOR) 10 mg tablet, Take 1 Tab by mouth daily. , Disp: 90 Tab, Rfl: 1   Date Last Reviewed: 6/10/21  Current Dietary Status:  [facility] soft/thin liquids; pureed foods at times      History of reflux:  NO    Reflux medication:  Social History/Home Situation: with spouse      Work/Activity History: working full time    OBJECTIVE:  Objective Measure: Tool Used: National Outcomes Measurement System: Functional Communication Measures: SWALLOWING  Score:  Initial: 4 Most Recent: X (Date: -- )   Interpretation of Tool: This measure describes the change in functional communication status subsequent to speech-language pathology treatment of patients with dysphagia.  o Level 1:  Individual is not able to swallow anything safely by mouth. All nutrition and hydration is received through non-oral means (e.g., nasogastric tube, PEG). o Level 2: Individual is not able to swallow safely by mouth for nutrition and hydration, but may take some consistency with consistent maximal cues in therapy only. Alternative method of feeding required. o Level 3:  Alternative method of feeding required as individual takes less than 50% of nutrition and hydration by mouth, and/or swallowing is safe with consistent use of moderate cues to use compensatory strategies and/or requires maximum diet restriction. o Level 4:  Swallowing is safe, but usually requires moderate cues to use compensatory strategies, and/or the individual has moderate diet restrictions and/or still requires tube feeding and/or oral supplements.   o Level 5:  Swallowing is safe with minimal diet restriction and/or occasionally requires minimal cueing to use compensatory strategies. The individual may occasionally self-cue. All nutrition and hydration needs are met by mouth at mealtime. o Level 6:  Swallowing is safe, and the individual eats and drinks independently and may rarely require minimal cueing. The individual usually self-cues when difficulty occurs. May need to avoid specific food items (e.g., popcorn and nuts), or require additional time (due to dysphagia). o Level 7: The individuals ability to eat independently is not limited by swallow function. Swallowing would be safe and efficient for all consistencies. Compensatory strategies are effectively used when needed. Score Level 7 Level 6 Level 5 Level 4 Level 3 Level 2 Level 1   Modifier CH CI CJ CK CL CM CN       Cognitive and Communication Status:  Mental status: alert    TREATMENT:    (In addition to Assessment/Re-Assessment sessions the following treatments were rendered)  Dysphagia Activities: Activities/Procedures listed utilized to improve progress in swallow function and swallow safety. Required minimal cueing to improve swallow safety. LARYNGEAL / PHARYNGEAL EXERCISES:           Effortful Swallow: Yes  Reps : 10  Sets : 1                                January: Yes  Reps : 10  Sets : 1  Mendelsohn Maneuver: Yes  Reps : 10  Sets : 1          Shaker: Yes  Reps :  (3 sets of 20 seconds; 3 sets of 10 reps)  Sets : 1                                                 ORAL MOTOR EXERCISES:                                                                                                    Lingual Lateralization-Exterior: Yes  Reps : 5 (decreased ROM to the right)  Sets: 1           Lingual Protrusion: Yes  Reps :  (left deviation noted; 3 reps)                                                __________________________________________________________________________________________________  Treatment Assessment:   .   Progression/Medical Necessity:   · Skilled intervention continues to be required due to persistent signs and symptoms of aspiration and patient still consuming a modified diet. Compliance with Program/Exercises: Will assess as treatment progresses. Reason for Continuation of Services/Other Comments:  · Patient continues to require skilled intervention due to dysphagia. Recommendations/Intent for next treatment session: \"Treatment next visit will focus on laryngeal exercises\".      Total Treatment Duration:  Time In: 1130  Time Out: 1200 Elliot Cruz, [facility] DEL [facility], [facility] AMANDA DEMARCO, [facility]    Visit Approval Visit # Therapist initials Date A NS / Cx < 24 hr >24 hr Cx Comments    1 RL 6/1 [x]  [] [] Initial evaluation    2 RL 6/4 [x] [] [] Tx     RL 6/8 [] [x] [] C/Cx    3 RL 6/10 [x] [] [] Tx    4 RL 6/14 [x] [] [] Tx       [] [] []        [] [] []        [] [] []        [] [] []        [] [] []        [] [] []        [] [] []        [] [] []        [] [] []        [] [] []        [] [] []        [] [] []        [] [] []

## 2021-06-22 ENCOUNTER — HOSPITAL ENCOUNTER (OUTPATIENT)
Dept: PHYSICAL THERAPY | Age: 66
Discharge: HOME | End: 2021-06-22
Payer: MEDICARE

## 2021-06-22 PROCEDURE — 92526 ORAL FUNCTION THERAPY: CPT

## 2021-06-22 NOTE — PROGRESS NOTES
Evelyn Griffin Sr.  : 1955  [place]  Secondary:  [address]  at [facility], [facility] [place], [address]  [phone]   [phone]        OUTPATIENT SPEECH LANGUAGE PATHOLOGY: Daily Note: 4  ICD-10: Treatment Diagnosis: dysphagia, pharyngeal R 13.13  REFERRING PHYSICIAN: Crissy Gonzalez MD MD Orders: speech evaluate and treat  PAST MEDICAL HISTORY:    Mr. Arnulfo Naidu is a 77 y.o. 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 He also  has a past surgical history that includes hx cervical laminectomy (); hx cervical fusion (2015); pr cardiac surg procedure unlist (); hx heart catheterization (); hx vascular access (2015); hx gi (2015); hx heent (2015); and hx colonoscopy (). MEDICAL/REFERRING DIAGNOSIS: Dysphagia, pharyngeal phase [R13.13]  DATE OF ONSET:    PRIOR LEVEL OF FUNCTION: with spouse  PRECAUTIONS/ALLERGIES: Adhesive, Codeine, Lortab [hydrocodone-acetaminophen], and Prednisone    ASSESSMENT:  Pt reported he is eating a little better but isn't gaining weight. He said he isn't eating enough to gain weight. He reported he was drinking 8 Ensure daily but it's too expensive to continue with that. He reported he plans to contact Jovani Germain for suggestions. He did well with exercises this date. He reported he can only attend therapy once weekly at this time due to work commitments and due to having ear surgery scheduled for next Tuesday. Patient will benefit from skilled intervention to address the below impairments. ?????? ? ? This section established at most recent assessment??????????  PROBLEM LIST (Impairments causing functional limitations):  1. Dysphagia   GOALS: (Goals have been discussed and agreed upon with patient.)  SHORT-TERM FUNCTIONAL GOALS: Time Frame: 3 months   Pt will complete laryngeal exercises with 80% accuracy. Pt will complete exercises at home a min of 5 days weekly. Pt will state compensatory strategies for po (alternate solids/liquids, use straws with liquids, periodically clear throat and double swallow with liquids) with only min cues needed. Pt will use compensatory strategies with po with only min cues needed. DISCHARGE GOALS: Time Frame: 4-5 months   1. Pt will tolerate least restrictive diet without signs/sx aspiration 100% for safe swallow function. REHABILITATION POTENTIAL FOR STATED GOALS: GoodPLAN OF CARE:  Patient will benefit from skilled intervention to address the following impairments. RECOMMENDATIONS AND PLANNED INTERVENTIONS (Benefits and precautions of therapy have been discussed with the patient.):  · PO:  Mechanical soft with chopped meat and vegetables  · Liquids:  regular thin  · use straws for liquids  MEDICATIONS:  · Crushed in puree  · whole in pureed   · Liquid medication when applicable  COMPENSATORY STRATEGIES/MODIFICATIONS INCLUDING:  · Alternate liquids/solids  · Use straws with liquids  · Clear throat and double swallow with liquids  OTHER RECOMMENDATIONS (including follow up treatment recommendations):   · Laryngeal exercises  RECOMMENDED DIET MODIFICATIONS DISCUSSED WITH:  · Patient  TREATMENT PLAN EFFECTIVE DATES: 6/1/2021 TO 8/30/2021 (90 days).   FREQUENCY/DURATION: Continue to follow patient 2 times a week for 90 days to address above goals. Regarding Anna Wood Feliciano's therapy, I certify that the treatment plan above will be carried out by a therapist or under their direction. Thank you for this referral,  Marychuy Peralta  [address] [address] [facility]                    Referring Physician Signature: Bradley Cadena MD    Date      SUBJECTIVE:  Pt cooperative. Present Symptoms: foods not going down      Current Medications:   Current Outpatient Medications:     levothyroxine (SYNTHROID) 75 mcg tablet, Take 1 Tab by mouth Daily (before breakfast). , Disp: 90 Tab, Rfl: 1    atorvastatin (LIPITOR) 10 mg tablet, Take 1 Tab by mouth daily. , Disp: 90 Tab, Rfl: 1   Date Last Reviewed: 6/10/21  Current Dietary Status:  [facility] soft/thin liquids; pureed foods at times      History of reflux:  NO    Reflux medication:  Social History/Home Situation: with spouse      Work/Activity History: working full time    OBJECTIVE:  Objective Measure: Tool Used: National Outcomes Measurement System: Functional Communication Measures: SWALLOWING  Score:  Initial: 4 Most Recent: X (Date: -- )   Interpretation of Tool: This measure describes the change in functional communication status subsequent to speech-language pathology treatment of patients with dysphagia.  o Level 1:  Individual is not able to swallow anything safely by mouth. All nutrition and hydration is received through non-oral means (e.g., nasogastric tube, PEG). o Level 2: Individual is not able to swallow safely by mouth for nutrition and hydration, but may take some consistency with consistent maximal cues in therapy only. Alternative method of feeding required. o Level 3:  Alternative method of feeding required as individual takes less than 50% of nutrition and hydration by mouth, and/or swallowing is safe with consistent use of moderate cues to use compensatory strategies and/or requires maximum diet restriction.   o Level 4:  Swallowing is safe, but usually requires moderate cues to use compensatory strategies, and/or the individual has moderate diet restrictions and/or still requires tube feeding and/or oral supplements. o Level 5:  Swallowing is safe with minimal diet restriction and/or occasionally requires minimal cueing to use compensatory strategies. The individual may occasionally self-cue. All nutrition and hydration needs are met by mouth at mealtime. o Level 6:  Swallowing is safe, and the individual eats and drinks independently and may rarely require minimal cueing. The individual usually self-cues when difficulty occurs. May need to avoid specific food items (e.g., popcorn and nuts), or require additional time (due to dysphagia). o Level 7: The individuals ability to eat independently is not limited by swallow function. Swallowing would be safe and efficient for all consistencies. Compensatory strategies are effectively used when needed. Score Level 7 Level 6 Level 5 Level 4 Level 3 Level 2 Level 1   Modifier CH CI CJ CK CL CM CN       Cognitive and Communication Status:  Mental status: alert    TREATMENT:    (In addition to Assessment/Re-Assessment sessions the following treatments were rendered)  Dysphagia Activities: Activities/Procedures listed utilized to improve progress in swallow function and swallow safety. Required minimal cueing to improve swallow safety. LARYNGEAL / PHARYNGEAL EXERCISES:           Effortful Swallow: Yes  Reps : 10  Sets : 1                                January:  Yes  Reps : 10  Sets : 1  Mendelsohn Maneuver: Yes  Reps : 10  Sets : 1          Shaker: Yes  Reps :  (3 sets of 20 seconds; 3 sets of 10 reps)  Sets : 1                                                 ORAL MOTOR EXERCISES:                                                                                                    Lingual Lateralization-Exterior: Yes  Reps : 5  Sets: 1           Lingual Protrusion: Yes  Reps : 10  Sets : 1 __________________________________________________________________________________________________  Treatment Assessment:   . Progression/Medical Necessity:   · Skilled intervention continues to be required due to persistent signs and symptoms of aspiration and patient still consuming a modified diet. Compliance with Program/Exercises: Will assess as treatment progresses. Reason for Continuation of Services/Other Comments:  · Patient continues to require skilled intervention due to dysphagia. Recommendations/Intent for next treatment session: \"Treatment next visit will focus on laryngeal exercises\".      Total Treatment Duration:  Time In: 1120  Time Out: 1200 Elliot Cruz, [facility], [facility] NIKITA AMANDA DEMARCO, [facility]-SLP    Visit Approval Visit # Therapist initials Date A NS / Cx < 24 hr >24 hr Cx Comments    1 RL 6/1 [x]  [] [] Initial evaluation    2 RL 6/4 [x] [] [] Tx     RL 6/8 [] [x] [] C/Cx    3 RL 6/10 [x] [] [] Tx    4 RL 6/14 [x] [] [] Tx    5 RL 6/22 [x] [] [] Tx       [] [] []        [] [] []        [] [] []        [] [] []        [] [] []        [] [] []        [] [] []        [] [] []        [] [] []        [] [] []        [] [] []        [] [] []

## 2021-07-01 ENCOUNTER — APPOINTMENT (OUTPATIENT)
Dept: PHYSICAL THERAPY | Age: 66
End: 2021-07-01

## 2021-07-06 ENCOUNTER — APPOINTMENT (OUTPATIENT)
Dept: PHYSICAL THERAPY | Age: 66
End: 2021-07-06

## 2021-08-12 NOTE — THERAPY DISCHARGE
Shaji Gallegos Sr.  : 1955  [place]  Secondary:  [address] at [facility], [facility], [address]  [phone]   [phone]        OUTPATIENT SPEECH LANGUAGE PATHOLOGY: Discontinuation Summary  ICD-10: Treatment Diagnosis: dysphagia, pharyngeal R 13.13  REFERRING PHYSICIAN: Pat Cooper MD MD Orders: speech evaluate and treat  PAST MEDICAL HISTORY:    Mr. Rod Larios is a 77 y.o. 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 He also  has a past surgical history that includes hx cervical laminectomy (); hx cervical fusion (2015); pr cardiac surg procedure unlist (); hx heart catheterization (); hx vascular access (2015); hx gi (2015); hx heent (2015); and hx colonoscopy (). MEDICAL/REFERRING DIAGNOSIS: Dysphagia, pharyngeal phase [R13.13]  DATE OF ONSET:    PRIOR LEVEL OF FUNCTION: with spouse  PRECAUTIONS/ALLERGIES: Adhesive, Codeine, Lortab [hydrocodone-acetaminophen], and Prednisone    ASSESSMENT:  Pt attended 5 sessions from - due to dysphagia. He has not attended any sessions since that time due to a staffing issue. Called pt this date ()  to determine if he plans to return to therapy. He reported his swallowing has greatly improved.   He reported he has gained 12 lbs and eating is \"no longer a chore\". He also reported his taste is returning. He reported he is practicing his exercises daily. Therefore, he does not feel he needs to return to therapy. SLP in agreement as he is independent with his home program.  Will DC at this time. ?????? ? ? This section established at most recent assessment??????????  PROBLEM LIST (Impairments causing functional limitations):  1. Dysphagia   GOALS:   SHORT-TERM FUNCTIONAL GOALS:  Pt will complete laryngeal exercises with 80% accuracy. Goal met. Pt will complete exercises at home a min of 5 days weekly. Goal met. Pt will state compensatory strategies for po (alternate solids/liquids, use straws with liquids, periodically clear throat and double swallow with liquids) with only min cues needed. Goal met. Pt will use compensatory strategies with po with only min cues needed. Goal not re-assessed. DISCHARGE GOALS:   1. Pt will tolerate least restrictive diet without signs/sx aspiration 100% for safe swallow function. Goal not re-assessed. PLAN OF CARE:  Discharge from [address] [place] [address] Thank you for this referral,  EFRAIN Lawrence, CCC-SLP      SUBJECTIVE:  Pt cooperative in therapy. OBJECTIVE:  Objective Measure: Tool Used: National Outcomes Measurement System: Functional Communication Measures: SWALLOWING  Score:  Initial: 4 Most Recent: X (Date: -- )   Interpretation of Tool: This measure describes the change in functional communication status subsequent to speech-language pathology treatment of patients with dysphagia.  o Level 1:  Individual is not able to swallow anything safely by mouth. All nutrition and hydration is received through non-oral means (e.g., nasogastric tube, PEG). o Level 2: Individual is not able to swallow safely by mouth for nutrition and hydration, but may take some consistency with consistent maximal cues in therapy only. Alternative method of feeding required.   o Level 3:  Alternative method of feeding required as individual takes less than 50% of nutrition and hydration by mouth, and/or swallowing is safe with consistent use of moderate cues to use compensatory strategies and/or requires maximum diet restriction. o Level 4:  Swallowing is safe, but usually requires moderate cues to use compensatory strategies, and/or the individual has moderate diet restrictions and/or still requires tube feeding and/or oral supplements. o Level 5:  Swallowing is safe with minimal diet restriction and/or occasionally requires minimal cueing to use compensatory strategies. The individual may occasionally self-cue. All nutrition and hydration needs are met by mouth at mealtime. o Level 6:  Swallowing is safe, and the individual eats and drinks independently and may rarely require minimal cueing. The individual usually self-cues when difficulty occurs. May need to avoid specific food items (e.g., popcorn and nuts), or require additional time (due to dysphagia). o Level 7: The individuals ability to eat independently is not limited by swallow function. Swallowing would be safe and efficient for all consistencies. Compensatory strategies are effectively used when needed.   Score Level 7 Level 6 Level 5 Level 4 Level 3 Level 2 Level 1   Modifier  CI CJ CK CL CM CN       Samantha Mera, MSP, CCC-SLP    Visit Approval Visit # Therapist initials Date A NS / Cx < 24 hr >24 hr Cx Comments    1 RL 6/1 [x]  [] [] Initial evaluation    2 RL 6/4 [x] [] [] Tx     RL 6/8 [] [x] [] C/Cx    3 RL 6/10 [x] [] [] Tx    4 RL 6/14 [x] [] [] Tx    5 RL 6/22 [x] [] [] Tx       [] [] []        [] [] []        [] [] []        [] [] []        [] [] []        [] [] []        [] [] []        [] [] []        [] [] []        [] [] []        [] [] []        [] [] []

## 2021-08-12 NOTE — PROGRESS NOTES
Speech Pathology  Called pt to determine if he is interested in returning to therapy. He reported he's been \"doing really good\". He reported he's gained 12 lbs and his swallowing has greatly improved. He has been practicing exercises as well. He reported it's not a chore to eat anymore and his taste is coming back. He reported he doesn't feel he needs to return to  as he's doing so well. Therefore, will DC from [address] [place] [address]        [address] [facility], [facility], [facility] AMANDA DEMARCO, CCC-SLP

## 2022-07-27 ENCOUNTER — APPOINTMENT (RX ONLY)
Dept: URBAN - METROPOLITAN AREA CLINIC 329 | Facility: CLINIC | Age: 67
Setting detail: DERMATOLOGY
End: 2022-07-27

## 2022-07-27 DIAGNOSIS — D22 MELANOCYTIC NEVI: ICD-10-CM

## 2022-07-27 DIAGNOSIS — L57.0 ACTINIC KERATOSIS: ICD-10-CM | Status: INADEQUATELY CONTROLLED

## 2022-07-27 DIAGNOSIS — Z85.828 PERSONAL HISTORY OF OTHER MALIGNANT NEOPLASM OF SKIN: ICD-10-CM

## 2022-07-27 DIAGNOSIS — D18.0 HEMANGIOMA: ICD-10-CM

## 2022-07-27 DIAGNOSIS — L85.3 XEROSIS CUTIS: ICD-10-CM

## 2022-07-27 DIAGNOSIS — L82.1 OTHER SEBORRHEIC KERATOSIS: ICD-10-CM

## 2022-07-27 DIAGNOSIS — L81.4 OTHER MELANIN HYPERPIGMENTATION: ICD-10-CM

## 2022-07-27 PROBLEM — D22.5 MELANOCYTIC NEVI OF TRUNK: Status: ACTIVE | Noted: 2022-07-27

## 2022-07-27 PROBLEM — D18.01 HEMANGIOMA OF SKIN AND SUBCUTANEOUS TISSUE: Status: ACTIVE | Noted: 2022-07-27

## 2022-07-27 PROCEDURE — ? LIQUID NITROGEN

## 2022-07-27 PROCEDURE — ? FULL BODY SKIN EXAM

## 2022-07-27 PROCEDURE — 99213 OFFICE O/P EST LOW 20 MIN: CPT | Mod: 25

## 2022-07-27 PROCEDURE — ? COUNSELING

## 2022-07-27 PROCEDURE — 17000 DESTRUCT PREMALG LESION: CPT

## 2022-07-27 PROCEDURE — 17003 DESTRUCT PREMALG LES 2-14: CPT

## 2022-07-27 PROCEDURE — ? TREATMENT REGIMEN

## 2022-07-27 ASSESSMENT — LOCATION DETAILED DESCRIPTION DERM
LOCATION DETAILED: LEFT CAVUM CONCHA
LOCATION DETAILED: SUPERIOR THORACIC SPINE
LOCATION DETAILED: LEFT NASAL SIDEWALL
LOCATION DETAILED: RIGHT MEDIAL BREAST 1-2:00 REGION
LOCATION DETAILED: LEFT ULNAR DORSAL HAND
LOCATION DETAILED: LEFT SUPERIOR MEDIAL FOREHEAD
LOCATION DETAILED: RIGHT ANTERIOR MEDIAL DISTAL UPPER ARM
LOCATION DETAILED: RIGHT SUPERIOR UPPER BACK

## 2022-07-27 ASSESSMENT — LOCATION ZONE DERM
LOCATION ZONE: TRUNK
LOCATION ZONE: EAR
LOCATION ZONE: HAND
LOCATION ZONE: NOSE
LOCATION ZONE: ARM
LOCATION ZONE: FACE

## 2022-07-27 ASSESSMENT — LOCATION SIMPLE DESCRIPTION DERM
LOCATION SIMPLE: UPPER BACK
LOCATION SIMPLE: LEFT FOREHEAD
LOCATION SIMPLE: RIGHT UPPER ARM
LOCATION SIMPLE: RIGHT UPPER BACK
LOCATION SIMPLE: LEFT EAR
LOCATION SIMPLE: LEFT NOSE
LOCATION SIMPLE: LEFT HAND
LOCATION SIMPLE: RIGHT BREAST

## 2022-07-27 NOTE — PROCEDURE: COUNSELING
Detail Level: Simple
Detail Level: Detailed
Patient Specific Counseling (Will Not Stick From Patient To Patient): Cerave moisturizer or Lipikar AP Balm
Detail Level: Zone

## 2022-10-07 ENCOUNTER — OFFICE VISIT (OUTPATIENT)
Dept: FAMILY MEDICINE CLINIC | Facility: CLINIC | Age: 67
End: 2022-10-07
Payer: MEDICARE

## 2022-10-07 VITALS
DIASTOLIC BLOOD PRESSURE: 78 MMHG | TEMPERATURE: 97.9 F | WEIGHT: 157 LBS | OXYGEN SATURATION: 97 % | BODY MASS INDEX: 23.39 KG/M2 | HEART RATE: 65 BPM | SYSTOLIC BLOOD PRESSURE: 120 MMHG

## 2022-10-07 DIAGNOSIS — Z23 FLU VACCINE NEED: ICD-10-CM

## 2022-10-07 DIAGNOSIS — C01 MALIGNANT NEOPLASM OF BASE OF TONGUE (HCC): ICD-10-CM

## 2022-10-07 DIAGNOSIS — N40.0 BENIGN PROSTATIC HYPERPLASIA WITHOUT LOWER URINARY TRACT SYMPTOMS: ICD-10-CM

## 2022-10-07 DIAGNOSIS — M21.611 BUNION, RIGHT FOOT: ICD-10-CM

## 2022-10-07 DIAGNOSIS — R53.83 FATIGUE, UNSPECIFIED TYPE: ICD-10-CM

## 2022-10-07 DIAGNOSIS — Z23 NEED FOR TDAP VACCINATION: ICD-10-CM

## 2022-10-07 DIAGNOSIS — E03.9 ACQUIRED HYPOTHYROIDISM: ICD-10-CM

## 2022-10-07 DIAGNOSIS — E78.5 HYPERLIPIDEMIA, UNSPECIFIED HYPERLIPIDEMIA TYPE: ICD-10-CM

## 2022-10-07 DIAGNOSIS — Z00.00 ENCOUNTER FOR SUBSEQUENT ANNUAL WELLNESS VISIT (AWV) IN MEDICARE PATIENT: Primary | ICD-10-CM

## 2022-10-07 LAB
ALBUMIN SERPL-MCNC: 3.8 G/DL (ref 3.2–4.6)
ALBUMIN/GLOB SERPL: 1.4 {RATIO} (ref 1.2–3.5)
ALP SERPL-CCNC: 78 U/L (ref 50–136)
ALT SERPL-CCNC: 28 U/L (ref 12–65)
ANION GAP SERPL CALC-SCNC: 5 MMOL/L (ref 4–13)
APPEARANCE UR: CLEAR
AST SERPL-CCNC: 22 U/L (ref 15–37)
BACTERIA URNS QL MICRO: 0 /HPF
BASOPHILS # BLD: 0 K/UL (ref 0–0.2)
BASOPHILS NFR BLD: 1 % (ref 0–2)
BILIRUB SERPL-MCNC: 0.6 MG/DL (ref 0.2–1.1)
BILIRUB UR QL: NEGATIVE
BUN SERPL-MCNC: 22 MG/DL (ref 8–23)
CALCIUM SERPL-MCNC: 9.3 MG/DL (ref 8.3–10.4)
CHLORIDE SERPL-SCNC: 108 MMOL/L (ref 101–110)
CHOLEST SERPL-MCNC: 207 MG/DL
CO2 SERPL-SCNC: 28 MMOL/L (ref 21–32)
COLOR UR: ABNORMAL
CREAT SERPL-MCNC: 1.1 MG/DL (ref 0.8–1.5)
DIFFERENTIAL METHOD BLD: ABNORMAL
EOSINOPHIL # BLD: 0.2 K/UL (ref 0–0.8)
EOSINOPHIL NFR BLD: 4 % (ref 0.5–7.8)
EPI CELLS #/AREA URNS HPF: ABNORMAL /HPF
ERYTHROCYTE [DISTWIDTH] IN BLOOD BY AUTOMATED COUNT: 12.7 % (ref 11.9–14.6)
GLOBULIN SER CALC-MCNC: 2.8 G/DL (ref 2.3–3.5)
GLUCOSE SERPL-MCNC: 87 MG/DL (ref 65–100)
GLUCOSE UR STRIP.AUTO-MCNC: NEGATIVE MG/DL
HCT VFR BLD AUTO: 47.7 % (ref 41.1–50.3)
HDLC SERPL-MCNC: 53 MG/DL (ref 40–60)
HDLC SERPL: 3.9 {RATIO}
HGB BLD-MCNC: 15.3 G/DL (ref 13.6–17.2)
HGB UR QL STRIP: NEGATIVE
IMM GRANULOCYTES # BLD AUTO: 0 K/UL (ref 0–0.5)
IMM GRANULOCYTES NFR BLD AUTO: 0 % (ref 0–5)
KETONES UR QL STRIP.AUTO: NEGATIVE MG/DL
LDLC SERPL CALC-MCNC: 142.8 MG/DL
LEUKOCYTE ESTERASE UR QL STRIP.AUTO: ABNORMAL
LYMPHOCYTES # BLD: 0.8 K/UL (ref 0.5–4.6)
LYMPHOCYTES NFR BLD: 18 % (ref 13–44)
MCH RBC QN AUTO: 32 PG (ref 26.1–32.9)
MCHC RBC AUTO-ENTMCNC: 32.1 G/DL (ref 31.4–35)
MCV RBC AUTO: 99.8 FL (ref 79.6–97.8)
MONOCYTES # BLD: 0.4 K/UL (ref 0.1–1.3)
MONOCYTES NFR BLD: 10 % (ref 4–12)
NEUTS SEG # BLD: 2.9 K/UL (ref 1.7–8.2)
NEUTS SEG NFR BLD: 67 % (ref 43–78)
NITRITE UR QL STRIP.AUTO: NEGATIVE
NRBC # BLD: 0 K/UL (ref 0–0.2)
OTHER OBSERVATIONS: ABNORMAL
PH UR STRIP: 6 [PH] (ref 5–9)
PLATELET # BLD AUTO: 181 K/UL (ref 150–450)
PMV BLD AUTO: 11.3 FL (ref 9.4–12.3)
POTASSIUM SERPL-SCNC: 4.4 MMOL/L (ref 3.5–5.1)
PROT SERPL-MCNC: 6.6 G/DL (ref 6.3–8.2)
PROT UR STRIP-MCNC: NEGATIVE MG/DL
PSA SERPL-MCNC: 3 NG/ML
RBC # BLD AUTO: 4.78 M/UL (ref 4.23–5.6)
RBC #/AREA URNS HPF: ABNORMAL /HPF
SODIUM SERPL-SCNC: 141 MMOL/L (ref 136–145)
SP GR UR REFRACTOMETRY: 1.02 (ref 1–1.02)
TRIGL SERPL-MCNC: 56 MG/DL (ref 35–150)
TSH, 3RD GENERATION: 1.59 UIU/ML (ref 0.36–3.74)
UROBILINOGEN UR QL STRIP.AUTO: 0.2 EU/DL (ref 0.2–1)
VLDLC SERPL CALC-MCNC: 11.2 MG/DL (ref 6–23)
WBC # BLD AUTO: 4.3 K/UL (ref 4.3–11.1)
WBC URNS QL MICRO: ABNORMAL /HPF

## 2022-10-07 PROCEDURE — 90471 IMMUNIZATION ADMIN: CPT | Performed by: FAMILY MEDICINE

## 2022-10-07 PROCEDURE — 1036F TOBACCO NON-USER: CPT | Performed by: FAMILY MEDICINE

## 2022-10-07 PROCEDURE — G8420 CALC BMI NORM PARAMETERS: HCPCS | Performed by: FAMILY MEDICINE

## 2022-10-07 PROCEDURE — 3017F COLORECTAL CA SCREEN DOC REV: CPT | Performed by: FAMILY MEDICINE

## 2022-10-07 PROCEDURE — 1123F ACP DISCUSS/DSCN MKR DOCD: CPT | Performed by: FAMILY MEDICINE

## 2022-10-07 PROCEDURE — 90715 TDAP VACCINE 7 YRS/> IM: CPT | Performed by: FAMILY MEDICINE

## 2022-10-07 PROCEDURE — G8484 FLU IMMUNIZE NO ADMIN: HCPCS | Performed by: FAMILY MEDICINE

## 2022-10-07 PROCEDURE — 90694 VACC AIIV4 NO PRSRV 0.5ML IM: CPT | Performed by: FAMILY MEDICINE

## 2022-10-07 PROCEDURE — G0439 PPPS, SUBSEQ VISIT: HCPCS | Performed by: FAMILY MEDICINE

## 2022-10-07 PROCEDURE — G0008 ADMIN INFLUENZA VIRUS VAC: HCPCS | Performed by: FAMILY MEDICINE

## 2022-10-07 PROCEDURE — G8427 DOCREV CUR MEDS BY ELIG CLIN: HCPCS | Performed by: FAMILY MEDICINE

## 2022-10-07 PROCEDURE — 99214 OFFICE O/P EST MOD 30 MIN: CPT | Performed by: FAMILY MEDICINE

## 2022-10-07 RX ORDER — ZOSTER VACCINE RECOMBINANT, ADJUVANTED 50 MCG/0.5
0.5 KIT INTRAMUSCULAR SEE ADMIN INSTRUCTIONS
Qty: 0.5 ML | Refills: 0 | Status: SHIPPED | OUTPATIENT
Start: 2022-10-07 | End: 2023-04-05

## 2022-10-07 RX ORDER — TRAZODONE HYDROCHLORIDE 100 MG/1
50-100 TABLET ORAL NIGHTLY PRN
Qty: 90 TABLET | Refills: 5 | Status: SHIPPED | OUTPATIENT
Start: 2022-10-07

## 2022-10-07 ASSESSMENT — LIFESTYLE VARIABLES: HOW MANY STANDARD DRINKS CONTAINING ALCOHOL DO YOU HAVE ON A TYPICAL DAY: PATIENT DOES NOT DRINK

## 2022-10-07 ASSESSMENT — ENCOUNTER SYMPTOMS
EYES NEGATIVE: 1
GASTROINTESTINAL NEGATIVE: 1
RESPIRATORY NEGATIVE: 1

## 2022-10-07 ASSESSMENT — PATIENT HEALTH QUESTIONNAIRE - PHQ9
2. FEELING DOWN, DEPRESSED OR HOPELESS: 0
SUM OF ALL RESPONSES TO PHQ QUESTIONS 1-9: 0
1. LITTLE INTEREST OR PLEASURE IN DOING THINGS: 0
SUM OF ALL RESPONSES TO PHQ QUESTIONS 1-9: 0
SUM OF ALL RESPONSES TO PHQ QUESTIONS 1-9: 0
SUM OF ALL RESPONSES TO PHQ9 QUESTIONS 1 & 2: 0
SUM OF ALL RESPONSES TO PHQ QUESTIONS 1-9: 0

## 2022-10-07 NOTE — PROGRESS NOTES
HISTORY OF PRESENT ILLNESS    Mariann Carbajal Sr. is a 69 y.o. male. HPI  Chief Complaint   Patient presents with    Medicare AW    Insomnia     See above. Increased fatigue in the last 8 months. Not sure if thyroid needs adjusting. Also having difficulty with sleep. Can fall asleep ok but wakes in an hour and cannot get back to sleep. Causes pt to feel tired during the day. No exertional symptoms. Toleraing thyroid supplement well. Followed by dermatologist for history of skin cancer. Tolerating statin with no side effects. History of CAD; no longer followed by cardiology. Bunion on the right foot has progressive worsening pain. Causes difficulty walking. Has tried cushioning and otc measures with no help. Current Outpatient Medications on File Prior to Visit   Medication Sig Dispense Refill    atorvastatin (LIPITOR) 10 MG tablet Take 10 mg by mouth daily      levothyroxine (SYNTHROID) 75 MCG tablet Take 75 mcg by mouth every morning (before breakfast)       No current facility-administered medications on file prior to visit.      Past Medical History:   Diagnosis Date    Asymmetrical hearing loss 9/19/2016    Asymmetrical hearing loss of left ear     Basal cell carcinoma     CAD (coronary artery disease) 2001    CABG x5, no mi---    no problems since---- does not have a current cardio    Cancer of tongue ([address] 4/14/15    base of tongue--- surg-- chemo --radiation--- all completed    Cancer of tonsil ([address] 4/14/15    left tonsil and lymphnode    Cervical stenosis of spinal canal 1/28/2015    Chronic pharyngitis 9/19/2016    Coagulopathy ([address] 10/25/2011    Eustachian tube dysfunction     Head and neck cancer ([address] 5/11/2015    Hearing loss in right ear 1996    r/t head injury    Heart disease     Hypothyroidism     Lymphedema     neck    Otalgia, unspecified ear     PONV (postoperative nausea and vomiting)     only had ponv x 1 -- no recent problems---per pt=-- zofran works    Squamous cell carcinoma of tonsil ([facility] 5/18/2015    Tongue lesion        Review of Systems   Constitutional: Negative. HENT: Negative. Eyes: Negative. Respiratory: Negative. Cardiovascular: Negative. Gastrointestinal: Negative. Genitourinary: Negative. Musculoskeletal:  Positive for arthralgias (painful bunion on right foot). Neurological: Negative. Psychiatric/Behavioral:  Positive for sleep disturbance. Negative for dysphoric mood. The patient is not nervous/anxious. Blood pressure 120/78, pulse 65, temperature 97.9 °F (36.6 °C), temperature source Temporal, weight 157 lb (71.2 kg), SpO2 97 %. Physical Exam  Vitals and nursing note reviewed. Constitutional:       Appearance: Normal appearance. HENT:      Mouth/Throat:      Mouth: Mucous membranes are moist.      Pharynx: Oropharynx is clear. Eyes:      Conjunctiva/sclera: Conjunctivae normal.   Neck:      Vascular: No carotid bruit. Cardiovascular:      Rate and Rhythm: Normal rate and regular rhythm. Heart sounds: Normal heart sounds. Pulmonary:      Breath sounds: Normal breath sounds. Musculoskeletal:         General: No swelling. Cervical back: Neck supple. Comments: Tender bunion on the right foot. No redness or swelling. Lymphadenopathy:      Cervical: No cervical adenopathy. Psychiatric:         Mood and Affect: Mood normal.        ASSESSMENT and Terrace Severino was seen today for medicare awv and insomnia. Diagnoses and all orders for this visit:    Encounter for subsequent annual wellness visit (AWV) in Medicare patient    Malignant neoplasm of base of tongue ([facility]    Acquired hypothyroidism  -     TSH; Future  -     TSH    Hyperlipidemia, unspecified hyperlipidemia type  -     Lipid Panel; Future  -     Lipid Panel    Fatigue, unspecified type  -     Urinalysis; Future  -     CBC with Auto Differential; Future  -     Comprehensive Metabolic Panel; Future  -     PSA, Diagnostic;  Future  -     PSA, Diagnostic  -     Comprehensive Metabolic Panel  -     CBC with Auto Differential  -     Urinalysis    Benign prostatic hyperplasia without lower urinary tract symptoms    Flu vaccine need  -     Influenza, FLUAD, (age 72 y+), IM, Preservative Free, 0.5 mL    Need for Tdap vaccination  -     Tdap, BOOSTRIX, (age 8 yrs+), IM    Bunion, right foot  -     [address] [place] [address]    Other orders  -     zoster recombinant adjuvanted vaccine (SHINGRIX) 50 MCG/0.5ML SUSR injection; Inject 0.5 mLs into the muscle See Admin Instructions 1 dose now and repeat in 2-6 months  Reviewed appropriate footwear. Follow up with ortho to evaluate bunion. Discussed appropriate use of trazodone for sleep. Notify lab results  Follow up if fatigue does not improve. Update immunizations. Return in 1 year (on 10/7/2023), or if symptoms worsen or fail to improve, for Medicare Annual Wellness Visit in 1 year. Luis Mccormick MD  Medicare Annual Wellness Visit    Joyce Onofre Sr. is here for Medicare AWV and Insomnia    Assessment & Plan   Encounter for subsequent annual wellness visit (AWV) in Medicare patient  Malignant neoplasm of base of tongue ([facility]  Acquired hypothyroidism  -     TSH; Future  Hyperlipidemia, unspecified hyperlipidemia type  -     Lipid Panel; Future  Fatigue, unspecified type  -     Urinalysis; Future  -     CBC with Auto Differential; Future  -     Comprehensive Metabolic Panel; Future  -     PSA, Diagnostic;  Future  Benign prostatic hyperplasia without lower urinary tract symptoms  Flu vaccine need  -     Influenza, FLUAD, (age 72 y+), IM, Preservative Free, 0.5 mL  Need for Tdap vaccination  -     Tdap, BOOSTRIX, (age 8 yrs+), IM  Bunion, right foot  -     [address] [place] [address] - [facility]    Recommendations for [employer]Plan Due: see orders and patient instructions/AVS.  Recommended screening schedule for the next 5-10 years is provided to the patient in written form: see Patient Instructions/AVS.     Return in 1 year (on 10/7/2023), or if symptoms worsen or fail to improve, for Medicare Annual Wellness Visit in 1 year. Subjective       Patient's complete Health Risk Assessment and screening values have been reviewed and are found in Flowsheets. The following problems were reviewed today and where indicated follow up appointments were made and/or referrals ordered. Positive Risk Factor Screenings with Interventions:     Cognitive: Words recalled: 3 Words Recalled  Clock Drawing Test (CDT): Normal  Total Score Interpretation: Abnormal Mini-Cog  Cognitive Impairment Interventions:             General Health and ACP:  General  In general, how would you say your health is?: Very Good  In the past 7 days, have you experienced any of the following: New or Increased Pain, New or Increased Fatigue, Loneliness, Social Isolation, Stress or Anger?: No  Do you get the social and emotional support that you need?: (!) No  Do you have a Living Will?: Yes    Advance Directives       Power of  Living Will ACP-Advance Directive ACP-Power of Herb Bolus on 12/23/15 Not on File Not on File Filed        General Health Risk Interventions:       Hearing/Vision:  Do you or your family notice any trouble with your hearing that hasn't been managed with hearing aids?: No  Do you have difficulty driving, watching TV, or doing any of your daily activities because of your eyesight?: No  Have you had an eye exam within the past year?: (!) No  No results found. Hearing/Vision Interventions:              Objective   Vitals:    10/07/22 0855   BP: 120/78   Site: Left Upper Arm   Position: Sitting   Cuff Size: Medium Adult   Pulse: 65   Temp: 97.9 °F (36.6 °C)   TempSrc: Temporal   SpO2: 97%   Weight: 157 lb (71.2 kg)      Body mass index is 23.39 kg/m².              Allergies   Allergen Reactions    Codeine Nausea And Vomiting

## 2022-10-07 NOTE — PATIENT INSTRUCTIONS
Personalized Preventive Plan for Gavino Barba Sr. - 10/7/2022  Medicare offers a range of preventive health benefits. Some of the tests and screenings are paid in full while other may be subject to a deductible, co-insurance, and/or copay. Some of these benefits include a comprehensive review of your medical history including lifestyle, illnesses that may run in your family, and various assessments and screenings as appropriate. After reviewing your medical record and screening and assessments performed today your provider may have ordered immunizations, labs, imaging, and/or referrals for you. A list of these orders (if applicable) as well as your Preventive Care list are included within your After Visit Summary for your review. Other Preventive Recommendations:    A preventive eye exam performed by an eye specialist is recommended every 1-2 years to screen for glaucoma; cataracts, macular degeneration, and other eye disorders. A preventive dental visit is recommended every 6 months. Try to get at least 150 minutes of exercise per week or 10,000 steps per day on a pedometer . Order or download the FREE \"Exercise & Physical Activity: Your Everyday Guide\" from [employer]. Call 7-149.904.5895 or search [employer] online. You need 9023-6015 mg of calcium and 8261-0493 IU of vitamin D per day. It is possible to meet your calcium requirement with diet alone, but a vitamin D supplement is usually necessary to meet this goal.  When exposed to the sun, use a sunscreen that protects against both UVA and UVB radiation with an SPF of 30 or greater. Reapply every 2 to 3 hours or after sweating, drying off with a towel, or swimming. Always wear a seat belt when traveling in a car. Always wear a helmet when riding a bicycle or motorcycle.

## 2022-10-11 ENCOUNTER — TELEPHONE (OUTPATIENT)
Dept: FAMILY MEDICINE CLINIC | Facility: CLINIC | Age: 67
End: 2022-10-11

## 2022-10-11 NOTE — TELEPHONE ENCOUNTER
----- Message from Marsha Beltran MD sent at 10/8/2022  8:44 AM EDT -----  Notify labs are normal. F/U as needed.

## 2022-10-28 ENCOUNTER — OFFICE VISIT (OUTPATIENT)
Dept: ORTHOPEDIC SURGERY | Age: 67
End: 2022-10-28
Payer: MEDICARE

## 2022-10-28 DIAGNOSIS — M21.611 BUNION OF RIGHT FOOT: Primary | ICD-10-CM

## 2022-10-28 PROCEDURE — 20600 DRAIN/INJ JOINT/BURSA W/O US: CPT | Performed by: ORTHOPAEDIC SURGERY

## 2022-10-28 PROCEDURE — 1123F ACP DISCUSS/DSCN MKR DOCD: CPT | Performed by: ORTHOPAEDIC SURGERY

## 2022-10-28 PROCEDURE — 3017F COLORECTAL CA SCREEN DOC REV: CPT | Performed by: ORTHOPAEDIC SURGERY

## 2022-10-28 PROCEDURE — 1036F TOBACCO NON-USER: CPT | Performed by: ORTHOPAEDIC SURGERY

## 2022-10-28 PROCEDURE — G8428 CUR MEDS NOT DOCUMENT: HCPCS | Performed by: ORTHOPAEDIC SURGERY

## 2022-10-28 PROCEDURE — G8484 FLU IMMUNIZE NO ADMIN: HCPCS | Performed by: ORTHOPAEDIC SURGERY

## 2022-10-28 PROCEDURE — G8420 CALC BMI NORM PARAMETERS: HCPCS | Performed by: ORTHOPAEDIC SURGERY

## 2022-10-28 PROCEDURE — 99203 OFFICE O/P NEW LOW 30 MIN: CPT | Performed by: ORTHOPAEDIC SURGERY

## 2022-10-28 NOTE — PROGRESS NOTES
Name: Eliel Ford Sr. YOB: 1955  Gender: male  MRN: [id number]    Summary: Right hallux rigidus: Treat with carbon fiber inserts, Voltaren gel, intra-articular injection. Follow-up in 2 to 3 months. At that point if still painful we can discuss operative versus nonoperative treatments. He is very active most likely does not want to consider any type of surgery. CAD     CC: Right great toe pain and stiffness    HPI: Porsha Carbajal Sr. is a 69 y.o. male who presents with pain and stiffness over the first MTP joint and great toe. They state is aggravated by walking, bending down, are attempting to go up and down on the great toe. Shoes that press down on the top of this big toe also create discomfort and force them to change their shoe wear. They do not describe  shooting numbness and tingling going into their great toe. The ability to run, jump, stand on their toes is limited. They can perform these actions, however it hurts and they have to be careful to limit their activities. He states he has had multiple injuries stomping it, jamming it, and breaking it in the past.  The past 10 years it has been getting worse and worse. Every year to his family doctor appointment they discussed that. This year it was worse than other years. The pain is becoming more significant. He is trying to manipulate it and move it is much as he can but it has not worked. He already takes over-the-counter nonsteroidals for this pain and states they briefly control the issue but do not significantly control her. 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 ROS/Meds/PSH/PMH/FH/SH: I personally reviewed the patients standard intake form. Below are the pertinents    Tobacco:  reports that he has never smoked.  He has never used smokeless tobacco.  Diabetes: None  Other: none,   Past Medical History:   Diagnosis Date    Asymmetrical hearing loss 9/19/2016    Asymmetrical hearing loss of left ear     Basal cell carcinoma     CAD (coronary artery disease) 2001    CABG x5, no mi---    no problems since---- does not have a current cardio    Cancer of tongue ([facility] 4/14/15    base of tongue--- surg-- chemo --radiation--- all completed    Cancer of tonsil ([facility] 4/14/15    left tonsil and lymphnode    Cervical stenosis of spinal canal 1/28/2015    Chronic pharyngitis 9/19/2016    Coagulopathy ([address] 10/25/2011    Eustachian tube dysfunction     Head and neck cancer ([facility] 5/11/2015    Hearing loss in right ear 1996    r/t head injury    Heart disease     Hypothyroidism     Lymphedema     neck    Otalgia, unspecified ear     PONV (postoperative nausea and vomiting)     only had ponv x 1 -- no recent problems---per pt=-- zofran works    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 Toes x 5 WWP w BCR. +SILT ssspdpt. ROM of hip, knee, ankle and foot with no difficulties and similar to other side. No instability of the ankle with drawer and stress. 55 strength to [id number]  No skin lesions, ulcers, edema, or ecchymosis except at the 1st MTP. There is some edema around the 1st MTP. 1st MTP exam reveals pain at end range of motion  normal silverskoid exam: With the hindfoot in neutral and forefoot supinated there is good ankle dorsiflexion with the knee flexed and extended. Neutral hindfoot alignment. No cavovarus nor planovalgus foot deformity  Talar tilt exam : normal  Anterior drawer exam w/ ankle plantarflexed at 20 deg: normal    Neuro:  normal SILT to s/s/sp/dp/t. Reflexes normal: 1+ patella reflex bilaterally, 1+ achilles reflex bilaterally, negative babinski bilaterally.  no signs of hyper reflexia or absent reflex    Vascular: BLE: 2+ DP pulse, toes wwp w/ BCR<2s  He has good hair growth on his big toe    Imaging:   I independently interpreted XR taken today and   X-Ray RIGHT Foot 3 vw (AP/Lateral/Oblique) for foot pain   Findings: No signs of fracture or dislocations. The TMT joints are aligned, there are no signs of neoplastic disease, or chronic disease. The midtarsal and subtalar joints appear normal.   Impression:  Normal foot   Signature: Dionicio Saldana MD       Assessment:   Right Hallux Rigidus    Plan:   4 This is a chronic illness/condition with exacerbation and progression  Treatment at this time: Minor Procedure: Injection done today: The patient understands the risks and complications associated with injection. After sterile prep of the area, the Right big toe MTP joint was injected with 1 cc. of Xylocaine and 1 cc. of steroid. . 40mg Depo Medrol was the steroid used. Patient tolerated it well. I discussed the risk of infection and skin blanching. I told the be patient be careful about the symptoms of hyperglycemia such as GI distress, polyuria, excessive thirst and lethargy. If these symptoms occur they should present to an primary care doctor or urgent facility    Carbon fiber inserts also given today    He is already tried Voltaren gel and over-the-counter nonsteroidals without success. Weight-bearing status: WBAT        Studies ordered: NO XR needed @ Next Visit  Return to work/work restrictions: none  OTC NSAIDs - We discussed using OTC NSAID's or tylenol for inflammation and pain. I discussed reading the bottle and following the instructions. I also briefly discussed how NSAIDs can cause GI irritation and Kidney damage. I also discussed Tylenols effect on the Liver.        Benjamin Syed MD    no surgery       Past Medical History:   Diagnosis Date    Asymmetrical hearing loss 9/19/2016    Asymmetrical hearing loss of left ear     Basal cell carcinoma     CAD (coronary artery disease) 2001    CABG x5, no mi---    no problems since---- does not have a current cardio    Can[facility] ([facility] 4/14/15    base of tongue--- surg-- chemo --radiation--- all completed    Cancer of tonsil ([facility] 4/14/15    left tonsil and lymphnode    Cervical stenosis of spinal canal 1/28/2015    Chronic pharyngitis 9/19/2016    Coagulopathy ([facility] 10/25/2011    Eustachian tube dysfunction     Head and neck cancer ([facility] 5/11/2015    Hearing loss in right ear 1996    r/t head injury    Heart disease     Hypothyroidism     Lymphedema     neck    Otalgia, unspecified ear     PONV (postoperative nausea and vomiting)     only had ponv x 1 -- no recent problems---per pt=-- zofran works    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

## 2023-03-22 RX ORDER — LEVOTHYROXINE SODIUM 0.07 MG/1
75 TABLET ORAL
Qty: 30 TABLET | Refills: 1 | Status: SHIPPED | OUTPATIENT
Start: 2023-03-22

## 2023-04-27 DIAGNOSIS — E03.9 ACQUIRED HYPOTHYROIDISM: Primary | ICD-10-CM

## 2023-05-01 ENCOUNTER — NURSE ONLY (OUTPATIENT)
Dept: FAMILY MEDICINE CLINIC | Facility: CLINIC | Age: 68
End: 2023-05-01

## 2023-05-01 DIAGNOSIS — E03.9 ACQUIRED HYPOTHYROIDISM: ICD-10-CM

## 2023-05-01 RX ORDER — LEVOTHYROXINE SODIUM 0.07 MG/1
75 TABLET ORAL
Qty: 90 TABLET | Refills: 3 | Status: SHIPPED | OUTPATIENT
Start: 2023-05-01

## 2023-05-02 LAB — TSH, 3RD GENERATION: 2.12 UIU/ML (ref 0.36–3.74)

## 2023-05-31 ENCOUNTER — APPOINTMENT (RX ONLY)
Dept: URBAN - METROPOLITAN AREA CLINIC 329 | Facility: CLINIC | Age: 68
Setting detail: DERMATOLOGY
End: 2023-05-31

## 2023-05-31 DIAGNOSIS — L81.4 OTHER MELANIN HYPERPIGMENTATION: ICD-10-CM

## 2023-05-31 DIAGNOSIS — L82.0 INFLAMED SEBORRHEIC KERATOSIS: ICD-10-CM

## 2023-05-31 PROCEDURE — ? LIQUID NITROGEN

## 2023-05-31 PROCEDURE — ? TREATMENT REGIMEN

## 2023-05-31 PROCEDURE — 17110 DESTRUCTION B9 LES UP TO 14: CPT

## 2023-05-31 PROCEDURE — 99212 OFFICE O/P EST SF 10 MIN: CPT | Mod: 25

## 2023-05-31 PROCEDURE — ? FULL BODY SKIN EXAM - DECLINED

## 2023-05-31 PROCEDURE — ? COUNSELING

## 2023-05-31 ASSESSMENT — LOCATION SIMPLE DESCRIPTION DERM
LOCATION SIMPLE: RIGHT CHEEK
LOCATION SIMPLE: NOSE
LOCATION SIMPLE: RIGHT CHEEK

## 2023-05-31 ASSESSMENT — LOCATION ZONE DERM
LOCATION ZONE: NOSE
LOCATION ZONE: FACE
LOCATION ZONE: FACE

## 2023-05-31 ASSESSMENT — LOCATION DETAILED DESCRIPTION DERM
LOCATION DETAILED: RIGHT INFERIOR CENTRAL MALAR CHEEK
LOCATION DETAILED: NASAL INFRATIP
LOCATION DETAILED: RIGHT INFERIOR CENTRAL MALAR CHEEK

## 2023-05-31 NOTE — PROCEDURE: LIQUID NITROGEN
Detail Level: Detailed
Render Note In Bullet Format When Appropriate: No
Show Spray Paint Technique Variable?: Yes
Medical Necessity Information: It is in your best interest to select a reason for this procedure from the list below. All of these items fulfill various CMS LCD requirements except the new and changing color options.
Consent: The patient's consent was obtained including but not limited to risks of crusting, scabbing, blistering, scarring, darker or lighter pigmentary change, recurrence, incomplete removal and infection.
Medical Necessity Clause: This procedure was medically necessary because the lesions that were treated were:
Post-Care Instructions: I reviewed with the patient in detail post-care instructions. Patient is to wear sunprotection, and avoid picking at any of the treated lesions. Pt may apply Vaseline to crusted or scabbing areas.
Spray Paint Text: The liquid nitrogen was applied to the skin utilizing a spray paint frosting technique.

## 2023-09-20 ENCOUNTER — APPOINTMENT (RX ONLY)
Dept: URBAN - METROPOLITAN AREA CLINIC 329 | Facility: CLINIC | Age: 68
Setting detail: DERMATOLOGY
End: 2023-09-20

## 2023-09-20 DIAGNOSIS — L81.4 OTHER MELANIN HYPERPIGMENTATION: ICD-10-CM

## 2023-09-20 DIAGNOSIS — L92.3 FOREIGN BODY GRANULOMA OF THE SKIN AND SUBCUTANEOUS TISSUE: ICD-10-CM

## 2023-09-20 DIAGNOSIS — Z85.828 PERSONAL HISTORY OF OTHER MALIGNANT NEOPLASM OF SKIN: ICD-10-CM

## 2023-09-20 DIAGNOSIS — D22 MELANOCYTIC NEVI: ICD-10-CM

## 2023-09-20 DIAGNOSIS — D18.0 HEMANGIOMA: ICD-10-CM

## 2023-09-20 DIAGNOSIS — L82.1 OTHER SEBORRHEIC KERATOSIS: ICD-10-CM

## 2023-09-20 PROBLEM — D22.5 MELANOCYTIC NEVI OF TRUNK: Status: ACTIVE | Noted: 2023-09-20

## 2023-09-20 PROBLEM — D18.01 HEMANGIOMA OF SKIN AND SUBCUTANEOUS TISSUE: Status: ACTIVE | Noted: 2023-09-20

## 2023-09-20 PROCEDURE — 10120 INC&RMVL FB SUBQ TISS SMPL: CPT

## 2023-09-20 PROCEDURE — ? TREATMENT REGIMEN

## 2023-09-20 PROCEDURE — ? FULL BODY SKIN EXAM

## 2023-09-20 PROCEDURE — ? COUNSELING

## 2023-09-20 PROCEDURE — 99213 OFFICE O/P EST LOW 20 MIN: CPT | Mod: 25

## 2023-09-20 PROCEDURE — ? FOREIGN BODY REMOVAL

## 2023-09-20 ASSESSMENT — LOCATION DETAILED DESCRIPTION DERM
LOCATION DETAILED: RIGHT INFERIOR MEDIAL UPPER BACK
LOCATION DETAILED: PERIUMBILICAL SKIN
LOCATION DETAILED: SUPERIOR LUMBAR SPINE
LOCATION DETAILED: EPIGASTRIC SKIN
LOCATION DETAILED: RIGHT MEDIAL UPPER BACK
LOCATION DETAILED: LEFT LATERAL PLANTAR 1ST TOE

## 2023-09-20 ASSESSMENT — LOCATION ZONE DERM
LOCATION ZONE: TOE
LOCATION ZONE: TRUNK

## 2023-09-20 ASSESSMENT — LOCATION SIMPLE DESCRIPTION DERM
LOCATION SIMPLE: RIGHT UPPER BACK
LOCATION SIMPLE: PLANTAR SURFACE OF LEFT 1ST TOE
LOCATION SIMPLE: LOWER BACK
LOCATION SIMPLE: ABDOMEN

## 2023-09-20 NOTE — HPI: EVALUATION OF SKIN LESION(S)
Hpi Title: Evaluation of Skin Lesions
How Severe Are Your Spot(S)?: moderate
Additional History: Pt states he has lesion on the bottom of his toe.

## 2023-10-26 SDOH — ECONOMIC STABILITY: INCOME INSECURITY: HOW HARD IS IT FOR YOU TO PAY FOR THE VERY BASICS LIKE FOOD, HOUSING, MEDICAL CARE, AND HEATING?: NOT HARD AT ALL

## 2023-10-26 SDOH — ECONOMIC STABILITY: TRANSPORTATION INSECURITY
IN THE PAST 12 MONTHS, HAS LACK OF TRANSPORTATION KEPT YOU FROM MEETINGS, WORK, OR FROM GETTING THINGS NEEDED FOR DAILY LIVING?: NO

## 2023-10-26 SDOH — HEALTH STABILITY: PHYSICAL HEALTH: ON AVERAGE, HOW MANY DAYS PER WEEK DO YOU ENGAGE IN MODERATE TO STRENUOUS EXERCISE (LIKE A BRISK WALK)?: 7 DAYS

## 2023-10-26 SDOH — ECONOMIC STABILITY: FOOD INSECURITY: WITHIN THE PAST 12 MONTHS, THE FOOD YOU BOUGHT JUST DIDN'T LAST AND YOU DIDN'T HAVE MONEY TO GET MORE.: NEVER TRUE

## 2023-10-26 SDOH — ECONOMIC STABILITY: HOUSING INSECURITY
IN THE LAST 12 MONTHS, WAS THERE A TIME WHEN YOU DID NOT HAVE A STEADY PLACE TO SLEEP OR SLEPT IN A SHELTER (INCLUDING NOW)?: YES

## 2023-10-26 SDOH — ECONOMIC STABILITY: FOOD INSECURITY: WITHIN THE PAST 12 MONTHS, YOU WORRIED THAT YOUR FOOD WOULD RUN OUT BEFORE YOU GOT MONEY TO BUY MORE.: NEVER TRUE

## 2023-10-26 ASSESSMENT — PATIENT HEALTH QUESTIONNAIRE - PHQ9
SUM OF ALL RESPONSES TO PHQ QUESTIONS 1-9: 0
1. LITTLE INTEREST OR PLEASURE IN DOING THINGS: 0
SUM OF ALL RESPONSES TO PHQ QUESTIONS 1-9: 0
SUM OF ALL RESPONSES TO PHQ QUESTIONS 1-9: 0
2. FEELING DOWN, DEPRESSED OR HOPELESS: 0
SUM OF ALL RESPONSES TO PHQ9 QUESTIONS 1 & 2: 0
SUM OF ALL RESPONSES TO PHQ QUESTIONS 1-9: 0

## 2023-10-26 ASSESSMENT — LIFESTYLE VARIABLES
HOW MANY STANDARD DRINKS CONTAINING ALCOHOL DO YOU HAVE ON A TYPICAL DAY: PATIENT DOES NOT DRINK
HOW OFTEN DO YOU HAVE A DRINK CONTAINING ALCOHOL: 1
HOW MANY STANDARD DRINKS CONTAINING ALCOHOL DO YOU HAVE ON A TYPICAL DAY: 0
HOW OFTEN DO YOU HAVE A DRINK CONTAINING ALCOHOL: NEVER
HOW OFTEN DO YOU HAVE SIX OR MORE DRINKS ON ONE OCCASION: 1

## 2023-10-27 ENCOUNTER — OFFICE VISIT (OUTPATIENT)
Dept: FAMILY MEDICINE CLINIC | Facility: CLINIC | Age: 68
End: 2023-10-27

## 2023-10-27 VITALS
TEMPERATURE: 97 F | OXYGEN SATURATION: 96 % | WEIGHT: 151 LBS | HEART RATE: 61 BPM | DIASTOLIC BLOOD PRESSURE: 66 MMHG | BODY MASS INDEX: 22.49 KG/M2 | SYSTOLIC BLOOD PRESSURE: 118 MMHG

## 2023-10-27 DIAGNOSIS — R53.83 FATIGUE, UNSPECIFIED TYPE: ICD-10-CM

## 2023-10-27 DIAGNOSIS — E78.5 HYPERLIPIDEMIA, UNSPECIFIED HYPERLIPIDEMIA TYPE: ICD-10-CM

## 2023-10-27 DIAGNOSIS — C01 MALIGNANT NEOPLASM OF BASE OF TONGUE (HCC): ICD-10-CM

## 2023-10-27 DIAGNOSIS — R63.4 WEIGHT LOSS: ICD-10-CM

## 2023-10-27 DIAGNOSIS — Z12.11 COLON CANCER SCREENING: ICD-10-CM

## 2023-10-27 DIAGNOSIS — Z12.5 SCREENING FOR PROSTATE CANCER: ICD-10-CM

## 2023-10-27 DIAGNOSIS — R63.0 DECREASED APPETITE: ICD-10-CM

## 2023-10-27 DIAGNOSIS — Z00.00 ENCOUNTER FOR SUBSEQUENT ANNUAL WELLNESS VISIT (AWV) IN MEDICARE PATIENT: Primary | ICD-10-CM

## 2023-10-27 DIAGNOSIS — E03.9 ACQUIRED HYPOTHYROIDISM: ICD-10-CM

## 2023-10-27 LAB
ALBUMIN SERPL-MCNC: 4.1 G/DL (ref 3.2–4.6)
ALBUMIN/GLOB SERPL: 1.5 (ref 0.4–1.6)
ALP SERPL-CCNC: 80 U/L (ref 50–136)
ALT SERPL-CCNC: 31 U/L (ref 12–65)
ANION GAP SERPL CALC-SCNC: 7 MMOL/L (ref 2–11)
APPEARANCE UR: CLEAR
AST SERPL-CCNC: 22 U/L (ref 15–37)
BASOPHILS # BLD: 0 K/UL (ref 0–0.2)
BASOPHILS NFR BLD: 1 % (ref 0–2)
BILIRUB SERPL-MCNC: 0.7 MG/DL (ref 0.2–1.1)
BILIRUB UR QL: NEGATIVE
BUN SERPL-MCNC: 28 MG/DL (ref 8–23)
CALCIUM SERPL-MCNC: 9.5 MG/DL (ref 8.3–10.4)
CHLORIDE SERPL-SCNC: 110 MMOL/L (ref 101–110)
CHOLEST SERPL-MCNC: 204 MG/DL
CO2 SERPL-SCNC: 26 MMOL/L (ref 21–32)
COLOR UR: ABNORMAL
CREAT SERPL-MCNC: 1.1 MG/DL (ref 0.8–1.5)
DIFFERENTIAL METHOD BLD: NORMAL
EOSINOPHIL # BLD: 0.2 K/UL (ref 0–0.8)
EOSINOPHIL NFR BLD: 3 % (ref 0.5–7.8)
ERYTHROCYTE [DISTWIDTH] IN BLOOD BY AUTOMATED COUNT: 12.4 % (ref 11.9–14.6)
GLOBULIN SER CALC-MCNC: 2.7 G/DL (ref 2.8–4.5)
GLUCOSE SERPL-MCNC: 100 MG/DL (ref 65–100)
GLUCOSE UR STRIP.AUTO-MCNC: NEGATIVE MG/DL
HCT VFR BLD AUTO: 48.7 % (ref 41.1–50.3)
HDLC SERPL-MCNC: 63 MG/DL (ref 40–60)
HDLC SERPL: 3.2
HGB BLD-MCNC: 16.1 G/DL (ref 13.6–17.2)
HGB UR QL STRIP: NEGATIVE
IMM GRANULOCYTES # BLD AUTO: 0 K/UL (ref 0–0.5)
IMM GRANULOCYTES NFR BLD AUTO: 0 % (ref 0–5)
KETONES UR QL STRIP.AUTO: NEGATIVE MG/DL
LDLC SERPL CALC-MCNC: 128.4 MG/DL
LEUKOCYTE ESTERASE UR QL STRIP.AUTO: NEGATIVE
LYMPHOCYTES # BLD: 0.8 K/UL (ref 0.5–4.6)
LYMPHOCYTES NFR BLD: 17 % (ref 13–44)
MCH RBC QN AUTO: 32.3 PG (ref 26.1–32.9)
MCHC RBC AUTO-ENTMCNC: 33.1 G/DL (ref 31.4–35)
MCV RBC AUTO: 97.6 FL (ref 82–102)
MONOCYTES # BLD: 0.4 K/UL (ref 0.1–1.3)
MONOCYTES NFR BLD: 9 % (ref 4–12)
NEUTS SEG # BLD: 3.4 K/UL (ref 1.7–8.2)
NEUTS SEG NFR BLD: 70 % (ref 43–78)
NITRITE UR QL STRIP.AUTO: NEGATIVE
NRBC # BLD: 0 K/UL (ref 0–0.2)
PH UR STRIP: 5.5 (ref 5–9)
PLATELET # BLD AUTO: 185 K/UL (ref 150–450)
PMV BLD AUTO: 10.8 FL (ref 9.4–12.3)
POTASSIUM SERPL-SCNC: 4.7 MMOL/L (ref 3.5–5.1)
PROT SERPL-MCNC: 6.8 G/DL (ref 6.3–8.2)
PROT UR STRIP-MCNC: NEGATIVE MG/DL
RBC # BLD AUTO: 4.99 M/UL (ref 4.23–5.6)
SODIUM SERPL-SCNC: 143 MMOL/L (ref 133–143)
SP GR UR REFRACTOMETRY: 1.03 (ref 1–1.02)
TRIGL SERPL-MCNC: 63 MG/DL (ref 35–150)
TSH, 3RD GENERATION: 1.23 UIU/ML (ref 0.36–3.74)
UROBILINOGEN UR QL STRIP.AUTO: 0.2 EU/DL (ref 0.2–1)
VLDLC SERPL CALC-MCNC: 12.6 MG/DL (ref 6–23)
WBC # BLD AUTO: 4.8 K/UL (ref 4.3–11.1)

## 2023-10-27 RX ORDER — MEGESTROL ACETATE 125 MG/ML
625 SUSPENSION ORAL DAILY
Qty: 150 ML | Refills: 3 | Status: SHIPPED | OUTPATIENT
Start: 2023-10-27

## 2023-10-27 ASSESSMENT — ENCOUNTER SYMPTOMS
RESPIRATORY NEGATIVE: 1
EYES NEGATIVE: 1
GASTROINTESTINAL NEGATIVE: 1
TROUBLE SWALLOWING: 1

## 2023-10-27 NOTE — PROGRESS NOTES
or tenderness. Normal range of motion. Cervical back: Neck supple. Lymphadenopathy:      Cervical: No cervical adenopathy. Neurological:      Cranial Nerves: No cranial nerve deficit. Coordination: Coordination normal.      Gait: Gait normal.      Deep Tendon Reflexes: Reflexes normal.   Psychiatric:         Mood and Affect: Mood normal.          ASSESSMENT and Melissa Amy was seen today for medicare awv. Diagnoses and all orders for this visit:    Encounter for subsequent annual wellness visit (AWV) in Medicare patient    Malignant neoplasm of base of tongue ([address])    Acquired hypothyroidism    Hyperlipidemia, unspecified hyperlipidemia type  -     Lipid Panel; Future  -     Lipid Panel    Fatigue, unspecified type  -     Urinalysis; Future  -     CBC with Auto Differential; Future  -     Comprehensive Metabolic Panel; Future  -     TSH; Future  -     TSH  -     Comprehensive Metabolic Panel  -     CBC with Auto Differential  -     Urinalysis    Screening for prostate cancer  -     PSA Screening; Future  -     PSA Screening    Colon cancer screening  -     [address] [facility]    Decreased appetite  -     megestrol (MEGACE ES) 625 MG/5ML suspension; Take 5 mLs by mouth daily    Weight loss  -     megestrol (MEGACE ES) 625 MG/5ML suspension; Take 5 mLs by mouth daily     Discussed appropriate calorie intake. Start Megace to increase appetite. Discussed evaluation by ENT and nutrition consult. Patient prefers first to see how he responds to Megace. Follow up if he decides to proceed with referral.  Discussed history and medications with patient. Continue current measures. Follow up if side effects occur or if new symptoms develop. Notify lab results  Schedule colonoscopy. Return in 1 year (on 10/27/2024), or if symptoms worsen or fail to improve.     Dock Butter, M[employer] Annual Wellness Visit    Cammie Tello Sr. is here for [facility]

## 2023-10-28 LAB — PSA SERPL-MCNC: 2.9 NG/ML

## 2023-10-30 ENCOUNTER — TELEPHONE (OUTPATIENT)
Dept: FAMILY MEDICINE CLINIC | Facility: CLINIC | Age: 68
End: 2023-10-30

## 2023-10-30 NOTE — TELEPHONE ENCOUNTER
Pt states that since his appt & flu shot on last Friday, he's not been feeling well. He states on Saturday morning he tested positive for Covid. Pt asking for advice regarding having covid. ALSO, pt states that the prescription sent in to help with his appetite is not covered by his insurance. Is there an alternative med? Please contact patient to discuss.

## 2023-10-30 NOTE — TELEPHONE ENCOUNTER
Pt has body ache, winded, headache, no cough, congestion    Informed pt to ask about Megace tablets if not call the insurance to find out what will be covered.

## 2024-02-29 ENCOUNTER — TELEPHONE (OUTPATIENT)
Dept: GASTROENTEROLOGY | Age: 69
End: 2024-02-29

## 2024-03-04 ENCOUNTER — TELEPHONE (OUTPATIENT)
Dept: GASTROENTEROLOGY | Age: 69
End: 2024-03-04

## 2024-03-27 ENCOUNTER — OFFICE VISIT (OUTPATIENT)
Dept: GASTROENTEROLOGY | Age: 69
End: 2024-03-27
Payer: MEDICARE

## 2024-03-27 ENCOUNTER — PREP FOR PROCEDURE (OUTPATIENT)
Dept: GASTROENTEROLOGY | Age: 69
End: 2024-03-27

## 2024-03-27 VITALS
SYSTOLIC BLOOD PRESSURE: 104 MMHG | WEIGHT: 154.4 LBS | HEIGHT: 69 IN | OXYGEN SATURATION: 96 % | HEART RATE: 58 BPM | BODY MASS INDEX: 22.87 KG/M2 | DIASTOLIC BLOOD PRESSURE: 66 MMHG

## 2024-03-27 DIAGNOSIS — Z12.11 SCREENING FOR COLON CANCER: ICD-10-CM

## 2024-03-27 DIAGNOSIS — Z12.11 COLON CANCER SCREENING: Primary | ICD-10-CM

## 2024-03-27 PROCEDURE — G8420 CALC BMI NORM PARAMETERS: HCPCS | Performed by: STUDENT IN AN ORGANIZED HEALTH CARE EDUCATION/TRAINING PROGRAM

## 2024-03-27 PROCEDURE — 99203 OFFICE O/P NEW LOW 30 MIN: CPT | Performed by: STUDENT IN AN ORGANIZED HEALTH CARE EDUCATION/TRAINING PROGRAM

## 2024-03-27 PROCEDURE — G8427 DOCREV CUR MEDS BY ELIG CLIN: HCPCS | Performed by: STUDENT IN AN ORGANIZED HEALTH CARE EDUCATION/TRAINING PROGRAM

## 2024-03-27 PROCEDURE — G8484 FLU IMMUNIZE NO ADMIN: HCPCS | Performed by: STUDENT IN AN ORGANIZED HEALTH CARE EDUCATION/TRAINING PROGRAM

## 2024-03-27 PROCEDURE — 1036F TOBACCO NON-USER: CPT | Performed by: STUDENT IN AN ORGANIZED HEALTH CARE EDUCATION/TRAINING PROGRAM

## 2024-03-27 PROCEDURE — 1123F ACP DISCUSS/DSCN MKR DOCD: CPT | Performed by: STUDENT IN AN ORGANIZED HEALTH CARE EDUCATION/TRAINING PROGRAM

## 2024-03-27 PROCEDURE — 3017F COLORECTAL CA SCREEN DOC REV: CPT | Performed by: STUDENT IN AN ORGANIZED HEALTH CARE EDUCATION/TRAINING PROGRAM

## 2024-03-27 RX ORDER — SODIUM CHLORIDE 0.9 % (FLUSH) 0.9 %
5-40 SYRINGE (ML) INJECTION PRN
Status: CANCELLED | OUTPATIENT
Start: 2024-03-27

## 2024-03-27 RX ORDER — SODIUM CHLORIDE 9 MG/ML
25 INJECTION, SOLUTION INTRAVENOUS PRN
Status: CANCELLED | OUTPATIENT
Start: 2024-03-27

## 2024-03-27 RX ORDER — SODIUM CHLORIDE 0.9 % (FLUSH) 0.9 %
5-40 SYRINGE (ML) INJECTION EVERY 12 HOURS SCHEDULED
Status: CANCELLED | OUTPATIENT
Start: 2024-03-27

## 2024-03-27 NOTE — PROGRESS NOTES
Topics    Alcohol use: No    Drug use: Never     Allergies   Allergen Reactions    Adhesive Tape Other (See Comments)     Tears skin  Paper tape okay    Codeine Nausea And Vomiting    Hydrocodone-Acetaminophen Nausea And Vomiting    Prednisone Nausea And Vomiting     Other reaction(s): Nausea / Vomiting, Unknown     Current Outpatient Medications   Medication Instructions    atorvastatin (LIPITOR) 10 mg, DAILY    levothyroxine (SYNTHROID) 75 mcg, Oral, DAILY BEFORE BREAKFAST    megestrol (MEGACE ES) 625 mg, Oral, DAILY    traZODone (DESYREL)  mg, Oral, NIGHTLY PRN     Review of Systems    ROS:    A complete 11 system ROS was performed and was negative aside from the pertinent negative and positives noted above.     PE:   Vitals:    03/27/24 1459   BP: 104/66   Pulse: 58   SpO2: 96%      General:  The patient appears well-nourished, and is in no acute distress.    Skin:  no rash, ulcers. No Bleeding or signs of infection.  HEENT:  Normocephalic, atraumatic. No sclerae icterus.   Neck:  No pain on palpation or mobilization of the neck.  Respiratory: Respiratory effort is normal. Expansion maintained bilaterally and symmetrically. Normal breath sounds and clear to auscultation bilaterally without wheezes, rales, or rhonchi.    Cardiovascular:  Regular rate and rhythm.     Abdomen:  Soft, non tender to palpation. No distention. Normoactive bowel sounds present.    Extremities: No edema bilaterally. No erythema  Neurologic:  Alert and oriented x3.  No sensory deficits. No asterixis  Psychiatric: Appropriate mood and affect.  Musculoskeletal: Strength and tone are symmetrical and maintained.    Assessment and Plan:   #Colon cancer screening:  He is in general good health, I think is reasonable to proceed with screening colonoscopy for him.  We will schedule this today. No significant co morbidities.       Raya Ayala MD  [facility]

## 2024-04-01 ENCOUNTER — OFFICE VISIT (OUTPATIENT)
Dept: FAMILY MEDICINE CLINIC | Facility: CLINIC | Age: 69
End: 2024-04-01
Payer: MEDICARE

## 2024-04-01 VITALS
HEART RATE: 59 BPM | WEIGHT: 154 LBS | BODY MASS INDEX: 22.94 KG/M2 | SYSTOLIC BLOOD PRESSURE: 104 MMHG | DIASTOLIC BLOOD PRESSURE: 60 MMHG | TEMPERATURE: 97.3 F | OXYGEN SATURATION: 98 %

## 2024-04-01 DIAGNOSIS — R20.0 NUMBNESS OF FINGERS: ICD-10-CM

## 2024-04-01 DIAGNOSIS — M79.644 PAIN IN FINGER OF BOTH HANDS: ICD-10-CM

## 2024-04-01 DIAGNOSIS — M79.645 PAIN IN FINGER OF BOTH HANDS: ICD-10-CM

## 2024-04-01 DIAGNOSIS — R13.11 ORAL PHASE DYSPHAGIA: Primary | ICD-10-CM

## 2024-04-01 DIAGNOSIS — K11.7 EXCESSIVE SALIVATION: ICD-10-CM

## 2024-04-01 PROCEDURE — G8427 DOCREV CUR MEDS BY ELIG CLIN: HCPCS | Performed by: FAMILY MEDICINE

## 2024-04-01 PROCEDURE — G8420 CALC BMI NORM PARAMETERS: HCPCS | Performed by: FAMILY MEDICINE

## 2024-04-01 PROCEDURE — 3017F COLORECTAL CA SCREEN DOC REV: CPT | Performed by: FAMILY MEDICINE

## 2024-04-01 PROCEDURE — 1036F TOBACCO NON-USER: CPT | Performed by: FAMILY MEDICINE

## 2024-04-01 PROCEDURE — 99213 OFFICE O/P EST LOW 20 MIN: CPT | Performed by: FAMILY MEDICINE

## 2024-04-01 PROCEDURE — 1123F ACP DISCUSS/DSCN MKR DOCD: CPT | Performed by: FAMILY MEDICINE

## 2024-04-01 RX ORDER — PREDNISONE 10 MG/1
1 TABLET ORAL SEE ADMIN INSTRUCTIONS
Qty: 1 EACH | Refills: 0 | Status: SHIPPED | OUTPATIENT
Start: 2024-04-01

## 2024-04-01 RX ORDER — SCOLOPAMINE TRANSDERMAL SYSTEM 1 MG/1
1 PATCH, EXTENDED RELEASE TRANSDERMAL
Qty: 3 PATCH | Refills: 5 | Status: SHIPPED | OUTPATIENT
Start: 2024-04-01

## 2024-04-01 ASSESSMENT — ENCOUNTER SYMPTOMS
NAUSEA: 0
SORE THROAT: 0
ABDOMINAL DISTENTION: 0
ABDOMINAL PAIN: 0
ANAL BLEEDING: 0
CONSTIPATION: 0
EYES NEGATIVE: 1
TROUBLE SWALLOWING: 1
BLOOD IN STOOL: 0
DIARRHEA: 0
RESPIRATORY NEGATIVE: 1
VOMITING: 0

## 2024-04-01 ASSESSMENT — PATIENT HEALTH QUESTIONNAIRE - PHQ9
SUM OF ALL RESPONSES TO PHQ QUESTIONS 1-9: 0
SUM OF ALL RESPONSES TO PHQ9 QUESTIONS 1 & 2: 0
SUM OF ALL RESPONSES TO PHQ QUESTIONS 1-9: 0
1. LITTLE INTEREST OR PLEASURE IN DOING THINGS: NOT AT ALL
2. FEELING DOWN, DEPRESSED OR HOPELESS: NOT AT ALL
SUM OF ALL RESPONSES TO PHQ QUESTIONS 1-9: 0
SUM OF ALL RESPONSES TO PHQ QUESTIONS 1-9: 0

## 2024-04-01 NOTE — PROGRESS NOTES
No tenderness.      Comments: Degenerative changes in fingers of both hands. Tender to pressure on dorsal aspect of both middle metacarpals. Full passive ROM but stiffness with flexion and extension of fingers of both hands against resistance. Wrists have full ROM with no tenderness. Equivocal Tinel's sign bilateral. Decreased sensitivity to light touch on the fingertips of both thumb and index fingers.   Lymphadenopathy:      Cervical: No cervical adenopathy.   Neurological:      Cranial Nerves: No cranial nerve deficit.      Sensory: Sensory deficit present.      Motor: No weakness.      Coordination: Abnormal coordination: see above.      Deep Tendon Reflexes: Reflexes normal.   Psychiatric:         Mood and Affect: Mood normal.          ASSESSMENT and PLAN    Laverne was seen today for hand swelling and numbness.    Diagnoses and all orders for this visit:    Oral phase dysphagia  -     [facility]    Excessive salivation  -     [facility]  -     scopolamine (TRANSDERM-SCOP) transdermal patch; Place 1 patch onto the skin every 72 hours    Pain in finger of both hands  -     [facility] (Hand Surgery)  -     predniSONE 10 MG (21) TBPK; Take 1 dose pack by mouth See Admin Instructions    Numbness of fingers  -     [facility] (Hand Surgery)     Explained evidence of arthritis and possible CTS. Follow up with ortho hand for further evaluation.  Referral to ENT to evaluate excessive saliva and difficulty swallowing. In the interim trial of scopolamine patch; follow up according to response.  Reviewed nutrition supplements to maintain weight.    Return in about 4 weeks (around 4/29/2024), or if symptoms worsen or fail to improve.    LAVERNE KILGORE JR, MD

## 2024-04-15 ENCOUNTER — OFFICE VISIT (OUTPATIENT)
Dept: ENT CLINIC | Age: 69
End: 2024-04-15
Payer: MEDICARE

## 2024-04-15 VITALS
WEIGHT: 158 LBS | RESPIRATION RATE: 17 BRPM | HEIGHT: 69 IN | BODY MASS INDEX: 23.4 KG/M2 | SYSTOLIC BLOOD PRESSURE: 124 MMHG | DIASTOLIC BLOOD PRESSURE: 76 MMHG

## 2024-04-15 DIAGNOSIS — C01 PRIMARY SQUAMOUS CELL CARCINOMA OF BASE OF TONGUE (HCC): ICD-10-CM

## 2024-04-15 DIAGNOSIS — Z85.89 HISTORY OF HEAD AND NECK CANCER: ICD-10-CM

## 2024-04-15 DIAGNOSIS — K11.7 EXCESSIVE SALIVATION: Primary | ICD-10-CM

## 2024-04-15 PROCEDURE — 3017F COLORECTAL CA SCREEN DOC REV: CPT | Performed by: STUDENT IN AN ORGANIZED HEALTH CARE EDUCATION/TRAINING PROGRAM

## 2024-04-15 PROCEDURE — 99204 OFFICE O/P NEW MOD 45 MIN: CPT | Performed by: STUDENT IN AN ORGANIZED HEALTH CARE EDUCATION/TRAINING PROGRAM

## 2024-04-15 PROCEDURE — 1123F ACP DISCUSS/DSCN MKR DOCD: CPT | Performed by: STUDENT IN AN ORGANIZED HEALTH CARE EDUCATION/TRAINING PROGRAM

## 2024-04-15 PROCEDURE — G8427 DOCREV CUR MEDS BY ELIG CLIN: HCPCS | Performed by: STUDENT IN AN ORGANIZED HEALTH CARE EDUCATION/TRAINING PROGRAM

## 2024-04-15 PROCEDURE — 1036F TOBACCO NON-USER: CPT | Performed by: STUDENT IN AN ORGANIZED HEALTH CARE EDUCATION/TRAINING PROGRAM

## 2024-04-15 PROCEDURE — G8420 CALC BMI NORM PARAMETERS: HCPCS | Performed by: STUDENT IN AN ORGANIZED HEALTH CARE EDUCATION/TRAINING PROGRAM

## 2024-04-15 PROCEDURE — 31575 DIAGNOSTIC LARYNGOSCOPY: CPT | Performed by: STUDENT IN AN ORGANIZED HEALTH CARE EDUCATION/TRAINING PROGRAM

## 2024-04-15 RX ORDER — ATROPINE SULFATE 10 MG/ML
1 SOLUTION/ DROPS OPHTHALMIC 3 TIMES DAILY
Qty: 10 ML | Refills: 4 | Status: SHIPPED | OUTPATIENT
Start: 2024-04-15

## 2024-04-15 ASSESSMENT — ENCOUNTER SYMPTOMS
NAUSEA: 0
SINUS PAIN: 0
STRIDOR: 0
CHOKING: 0
SHORTNESS OF BREATH: 0
EYE PAIN: 0
SINUS PRESSURE: 0
CONSTIPATION: 0
EYE ITCHING: 0
WHEEZING: 0
EYE DISCHARGE: 0
APNEA: 0
FACIAL SWELLING: 0
DIARRHEA: 0
TROUBLE SWALLOWING: 1
COUGH: 0

## 2024-04-15 NOTE — PROGRESS NOTES
HPI:  Easton Carbajal . is a 68 y.o. male seen New    Chief Complaint   Patient presents with    Dysphagia     Patient presents today with c/o excessive salivation and continued swallowing difficulty . Patient states that this has limited his diet. He'd like to know if there is any treatment for the saliva because adversely he has dry mouth while sleeping .        68-year-old male seen as a new patient evaluation today with concern of excessive salivation in the setting of having a history of base of tongue squamous cell carcinoma.  He describes being a former patient of Dr. Rosario having presented originally with a left-sided neck mass found to have a left-sided base of tongue mass undergoing base of tongue/tonsil biopsy showing p16 positive squamous cell carcinoma.  He eventually underwent definitive chemoradiation therapy and has done well without recurrence for the last 10 years.  He has intermittently struggled with post radiation of symptoms to include dysphagia chronic dry mouth and dental concerns.  All of this has progressively worsened over the last couple years.  Today he endorses the majority of concerns is with his salivation.  During the daytime he has excessive salivation and he describes that he has to chronically swallow his saliva in order to speak or choke and at night unfortunately he struggles with the opposite severe dry mouth.  His excessive daytime salivation has become worse and more problematic during communication and his occupation.  He is here to discuss options to eradicate some of the excessive saliva.  He drinks water at night for his dry mouth.  Long-term dysphagia has been stable but slowly progressing and he has a significant modified diet where he purées most of his foods.  He has had no recent weight changes.  He had formally been through extensive speech therapy when he was diagnosed and treated for his tongue cancer.    Past Medical History, Past Surgical History,

## 2024-04-18 ENCOUNTER — OFFICE VISIT (OUTPATIENT)
Dept: ORTHOPEDIC SURGERY | Age: 69
End: 2024-04-18
Payer: MEDICARE

## 2024-04-18 DIAGNOSIS — R20.0 NUMBNESS AND TINGLING OF BOTH UPPER EXTREMITIES: ICD-10-CM

## 2024-04-18 DIAGNOSIS — M79.642 BILATERAL HAND PAIN: Primary | ICD-10-CM

## 2024-04-18 DIAGNOSIS — M79.641 BILATERAL HAND PAIN: Primary | ICD-10-CM

## 2024-04-18 DIAGNOSIS — R20.2 NUMBNESS AND TINGLING OF BOTH UPPER EXTREMITIES: ICD-10-CM

## 2024-04-18 PROCEDURE — 3017F COLORECTAL CA SCREEN DOC REV: CPT | Performed by: NURSE PRACTITIONER

## 2024-04-18 PROCEDURE — 1123F ACP DISCUSS/DSCN MKR DOCD: CPT | Performed by: NURSE PRACTITIONER

## 2024-04-18 PROCEDURE — 99214 OFFICE O/P EST MOD 30 MIN: CPT | Performed by: NURSE PRACTITIONER

## 2024-04-18 PROCEDURE — 1036F TOBACCO NON-USER: CPT | Performed by: NURSE PRACTITIONER

## 2024-04-18 PROCEDURE — G8427 DOCREV CUR MEDS BY ELIG CLIN: HCPCS | Performed by: NURSE PRACTITIONER

## 2024-04-18 PROCEDURE — G8420 CALC BMI NORM PARAMETERS: HCPCS | Performed by: NURSE PRACTITIONER

## 2024-04-18 NOTE — PROGRESS NOTES
Orthopaedic Hand Surgery Note    Name: Easton Carbajal .  YOB: 1955  Gender: male  MRN: [id number]    CC: New patient referred for bilateral hand pain, numbness, and tingling      HPI: Patient is a 68 y.o. male with a chief complaint of left hand numbness and tingling in the median nerve distribution. The symptoms have been going on for over 3 months. The patient does complain of night wakening and increased symptoms with driving. Evaluation to date has included none. Treatment to date has included none.  He also reports that his middle and index finger on both fingers are painful.  He has an extensive surgical history regarding his neck.  He had a C5-6 and 6-7 ACDF by Dr. Pacheco.  He also notes a posterior cervical surgery done years before that..    ROS/Meds/PSH/PMH/FH/SH: I personally reviewed the patients standard intake form.  Pertinents are discussed In the HPI    Physical Examination:    Musculoskeletal:   Cervical spine has decreased range of motion without tenderness to palpation, negative Spurling's test. Shoulders and elbows have normal pain free range of motion.    Examination of the bilateral upper extremity demonstrates Decreased sensation to light touch in the median distribution, normal sensation in ulnar and radial distribution, positive carpal tunnel compression testing and Phalen testing, cap refill < 5 seconds in all fingers. Inspection reveals no thenar atrophy. Negative Tinel and elbow flexion compression test of the cubital tunnel, negative Tinel over Guyon's canal. Sensation to light touch in the ulnar 2 digits is normal with no intrinsic atrophy/weakness. No tenderness to palpation or masses noted in the forearm.    Patient has swelling over the dorsal aspect of his bilateral hands over the second and third MCP joints.  He is tender to palpation at the MCP joints of bilateral index and middle finger.  He is unable to make a tight composite fist with the index

## 2024-04-19 ENCOUNTER — TELEPHONE (OUTPATIENT)
Dept: ORTHOPEDIC SURGERY | Age: 69
End: 2024-04-19

## 2024-04-19 DIAGNOSIS — M79.642 BILATERAL HAND PAIN: Primary | ICD-10-CM

## 2024-04-19 DIAGNOSIS — M79.641 BILATERAL HAND PAIN: Primary | ICD-10-CM

## 2024-04-19 RX ORDER — MELOXICAM 15 MG/1
15 TABLET ORAL DAILY
Qty: 30 TABLET | Refills: 1 | Status: SHIPPED | OUTPATIENT
Start: 2024-04-19 | End: 2024-06-18

## 2024-04-19 NOTE — TELEPHONE ENCOUNTER
He was prescribed a gel yesterday and [facility] doesn't have it. They have it in oral form but not gel. Please call.

## 2024-04-19 NOTE — TELEPHONE ENCOUNTER
Spoke to the patient and he is okay with regular Mobic and just grinding them up. Sent a message to Giovanna.

## 2024-04-22 ENCOUNTER — TELEPHONE (OUTPATIENT)
Dept: ORTHOPEDIC SURGERY | Age: 69
End: 2024-04-22

## 2024-04-22 ENCOUNTER — TELEPHONE (OUTPATIENT)
Dept: GASTROENTEROLOGY | Age: 69
End: 2024-04-22

## 2024-04-22 NOTE — TELEPHONE ENCOUNTER
Pt will be out of town Friday 4/26 and needs to reschedule his procedure. He will call back when he is ready to reschedule and is sure he will be in town and have a ride.

## 2024-04-23 ENCOUNTER — TELEPHONE (OUTPATIENT)
Dept: GASTROENTEROLOGY | Age: 69
End: 2024-04-23

## 2024-04-25 ENCOUNTER — PROCEDURE VISIT (OUTPATIENT)
Dept: NEUROLOGY | Age: 69
End: 2024-04-25

## 2024-04-25 VITALS — BODY MASS INDEX: 22.33 KG/M2 | OXYGEN SATURATION: 97 % | HEIGHT: 70 IN | HEART RATE: 59 BPM | WEIGHT: 156 LBS

## 2024-04-25 DIAGNOSIS — R20.2 NUMBNESS AND TINGLING OF BOTH UPPER EXTREMITIES: Primary | ICD-10-CM

## 2024-04-25 DIAGNOSIS — R20.0 NUMBNESS AND TINGLING OF BOTH UPPER EXTREMITIES: Primary | ICD-10-CM

## 2024-04-25 NOTE — PROGRESS NOTES
EMG/Nerve Conduction Study Procedure Note  [address]    [address]  [place]  [address]   [phone]      Hx:    Exam:     68 y.o.   M W male   EMG   BUE   -- CTS CuTS = paresthesia hands = bilateral and history of cervical operations chemotherapy or radiation of his neck for neck cancer etc.  back 8 years ago.  Low thyroid.  Referring: Giovanna Dunlap CNP  Technologist: Jason Jin  Height:   5'10\"        Summary    NCV upper extremities with selected muscle needle EMG.                  Controlled environmental factors / EMG lab.  Temperature.   NCV : sensory segments:    Abnormal = slowed bilateral median nerve SCV with attenuated SNAP.  Normal bilateral ulnar bilateral radial SCV.  NCV transcarpal sensory segments:     Abnormal = bilateral transcarpal median nerve SCV slowing with marked attenuation on the left and some attenuation right.  Bilateral transcarpal ulnar within normal limits.  Peak difference of latency on the right is 0.44 ms (UL = 0.20 ms); on the left peak is 0.42 ms.  NCV Motor MCV segments:     Basically normal = although there is a general decrease of all of the CMAP amplitudes, no other conduction abnormalities are identified.  F-wave studies:         Abnormal = right median F wave latencies are significantly prolonged.  Left median F wave latencies are also mildly prolonged.  Ulnar F waves are within normal limits.   NEEDLE EMG:   Tested muscles::    Tested left FDI APB ADM EDC triceps deltoid biceps = normal.  Right FDI APB ADM = normal.   Normal insertional activity and interference pattern/recruitment.  No fasciculations fibrillations positive sharp waves.  Normal MUP.  No BSS AP.  No giant MUP.  No myotonia.  No upper motor neuron sign.         INTERPRETATION:    THESE FINDINGS ARE ELECTROPHYSIOLOGIC EVIDENCE OF ENTRAPMENT OF THE MEDIAN NERVE AT THE WRIST BILATERALLY OF A MILD DEGREE.  NO EVIDENCE OF AXONAL DENERVATION.  NO OTHER DENERVATION OR NEUROPATHY.  NO RADICULOPATHY.

## 2024-04-26 PROBLEM — Z12.11 SCREENING FOR COLON CANCER: Status: RESOLVED | Noted: 2024-03-27 | Resolved: 2024-04-26

## 2024-04-30 ENCOUNTER — OFFICE VISIT (OUTPATIENT)
Age: 69
End: 2024-04-30
Payer: MEDICARE

## 2024-04-30 DIAGNOSIS — M19.042 DEGENERATIVE ARTHRITIS OF MIDDLE FINGER OF LEFT HAND: Primary | ICD-10-CM

## 2024-04-30 DIAGNOSIS — G56.03 BILATERAL CARPAL TUNNEL SYNDROME: ICD-10-CM

## 2024-04-30 DIAGNOSIS — M19.041 DEGENERATIVE ARTHRITIS OF MIDDLE FINGER OF RIGHT HAND: ICD-10-CM

## 2024-04-30 PROCEDURE — G8420 CALC BMI NORM PARAMETERS: HCPCS | Performed by: NURSE PRACTITIONER

## 2024-04-30 PROCEDURE — 3017F COLORECTAL CA SCREEN DOC REV: CPT | Performed by: NURSE PRACTITIONER

## 2024-04-30 PROCEDURE — G8427 DOCREV CUR MEDS BY ELIG CLIN: HCPCS | Performed by: NURSE PRACTITIONER

## 2024-04-30 PROCEDURE — 20600 DRAIN/INJ JOINT/BURSA W/O US: CPT | Performed by: NURSE PRACTITIONER

## 2024-04-30 PROCEDURE — 99214 OFFICE O/P EST MOD 30 MIN: CPT | Performed by: NURSE PRACTITIONER

## 2024-04-30 PROCEDURE — 1123F ACP DISCUSS/DSCN MKR DOCD: CPT | Performed by: NURSE PRACTITIONER

## 2024-04-30 PROCEDURE — 1036F TOBACCO NON-USER: CPT | Performed by: NURSE PRACTITIONER

## 2024-04-30 PROCEDURE — 20526 THER INJECTION CARP TUNNEL: CPT | Performed by: NURSE PRACTITIONER

## 2024-04-30 RX ORDER — BETAMETHASONE SODIUM PHOSPHATE AND BETAMETHASONE ACETATE 3; 3 MG/ML; MG/ML
6 INJECTION, SUSPENSION INTRA-ARTICULAR; INTRALESIONAL; INTRAMUSCULAR; SOFT TISSUE ONCE
Status: COMPLETED | OUTPATIENT
Start: 2024-04-30 | End: 2024-04-30

## 2024-04-30 RX ADMIN — BETAMETHASONE SODIUM PHOSPHATE AND BETAMETHASONE ACETATE 6 MG: 3; 3 INJECTION, SUSPENSION INTRA-ARTICULAR; INTRALESIONAL; INTRAMUSCULAR; SOFT TISSUE at 10:38

## 2024-04-30 RX ADMIN — BETAMETHASONE SODIUM PHOSPHATE AND BETAMETHASONE ACETATE 6 MG: 3; 3 INJECTION, SUSPENSION INTRA-ARTICULAR; INTRALESIONAL; INTRAMUSCULAR; SOFT TISSUE at 10:37

## 2024-04-30 NOTE — PROGRESS NOTES
injection and no injection therapy were discussed.  Risks including skin blanching, subcutaneous fat atrophy, and elevations in blood glucose levels were discussed. The patient consented for an injection. The patient has been identified by name and birthdate. The injection site was identified, marked and prepped with a alcohol swab. Time out completed. The bilateral middle finger MCP joints  was injected with 1 ml of 6 mg/ml Celestone and 1 ml xylocaine plain 1%. The injection site was then dressed with a bandaid. The patient tolerated the injection well. The patient was instructed to monitor their blood sugars if diabetic and call if any concerns. The patient was instructed to call the office if any adverse local effects occurred or any if any questions or concerns arise.     Patient voiced accordance and understanding of the information provided and the formulated plan. All questions were answered to the patient's satisfaction during the encounter.    4 This is a chronic illness with exacerbation, progression, or side effect of treatment  Treatment at this time: Minor procedure: cortisone injection. I discussed the risk of skin blanching and hyperglycemia.  I discussed the symptoms of hyperglycemia such as confusion, lethargy, polyuria and polydipsia.  If any of these symptoms occur the patient is to present to an urgent care facility or primary care doctor for blood sugar evaluation.    HILDA Gilman - CNP  Orthopaedic Surgery  04/30/24  11:10 AM

## 2024-05-06 ENCOUNTER — TELEPHONE (OUTPATIENT)
Dept: ORTHOPEDIC SURGERY | Age: 69
End: 2024-05-06

## 2024-05-06 NOTE — TELEPHONE ENCOUNTER
I returned patient call. I explained that since it has only been a 6 days that he will need to give the medication more time to work. He understands to call back if he has no improvement in several weeks.

## 2024-05-20 RX ORDER — LEVOTHYROXINE SODIUM 0.07 MG/1
75 TABLET ORAL
Qty: 90 TABLET | Refills: 3 | OUTPATIENT
Start: 2024-05-20

## 2024-06-12 RX ORDER — LEVOTHYROXINE SODIUM 0.07 MG/1
75 TABLET ORAL
Qty: 90 TABLET | Refills: 3 | Status: SHIPPED | OUTPATIENT
Start: 2024-06-12

## 2024-07-18 ENCOUNTER — OFFICE VISIT (OUTPATIENT)
Age: 69
End: 2024-07-18

## 2024-07-18 DIAGNOSIS — M19.042 DEGENERATIVE ARTHRITIS OF MIDDLE FINGER OF LEFT HAND: ICD-10-CM

## 2024-07-18 DIAGNOSIS — M19.041 DEGENERATIVE ARTHRITIS OF MIDDLE FINGER OF RIGHT HAND: Primary | ICD-10-CM

## 2024-07-18 RX ORDER — METHYLPREDNISOLONE ACETATE 40 MG/ML
40 INJECTION, SUSPENSION INTRA-ARTICULAR; INTRALESIONAL; INTRAMUSCULAR; SOFT TISSUE ONCE
Status: COMPLETED | OUTPATIENT
Start: 2024-07-18 | End: 2024-07-18

## 2024-07-18 RX ADMIN — METHYLPREDNISOLONE ACETATE 40 MG: 40 INJECTION, SUSPENSION INTRA-ARTICULAR; INTRALESIONAL; INTRAMUSCULAR; SOFT TISSUE at 09:46

## 2024-07-18 NOTE — PROGRESS NOTES
were discussed.  Risks including skin blanching, subcutaneous fat atrophy, and elevations in blood glucose levels were discussed. The patient consented for an injection. The patient has been identified by name and birthdate. The injection site was identified, marked and prepped with a alcohol swab. Time out completed. The right middle finger MCP was injected with 1ml of 40mg/ml Depomedrol and 1ml xylocaine plain 1%. The injection site was then dressed with a bandaid. The patient tolerated the injection well. The patient was instructed to monitor their blood sugars if diabetic and call if any concerns. The patient was instructed to call the office if any adverse local effects occurred or any if any questions or concerns arise.     Patient voiced accordance and understanding of the information provided and the formulated plan. All questions were answered to the patient's satisfaction during the encounter.    4 This is a chronic illness with exacerbation, progression, or side effect of treatment  Treatment at this time: Minor procedure: cortisone injection. I discussed the risk of skin blanching and hyperglycemia.  I discussed the symptoms of hyperglycemia such as confusion, lethargy, polyuria and polydipsia.  If any of these symptoms occur the patient is to present to an urgent care facility or primary care doctor for blood sugar evaluation.    HILDA Gilman - CNP  Orthopaedic Surgery  07/18/24  10:04 AM

## 2024-09-06 ENCOUNTER — TELEPHONE (OUTPATIENT)
Dept: FAMILY MEDICINE CLINIC | Facility: CLINIC | Age: 69
End: 2024-09-06

## 2024-09-06 DIAGNOSIS — E78.5 HYPERLIPIDEMIA, UNSPECIFIED HYPERLIPIDEMIA TYPE: ICD-10-CM

## 2024-09-06 DIAGNOSIS — R53.83 FATIGUE, UNSPECIFIED TYPE: ICD-10-CM

## 2024-09-06 DIAGNOSIS — E03.9 ACQUIRED HYPOTHYROIDISM: Primary | ICD-10-CM

## 2024-09-06 DIAGNOSIS — Z12.5 SPECIAL SCREENING FOR MALIGNANT NEOPLASM OF PROSTATE: ICD-10-CM

## 2024-10-23 ENCOUNTER — OFFICE VISIT (OUTPATIENT)
Age: 69
End: 2024-10-23
Payer: MEDICARE

## 2024-10-23 DIAGNOSIS — M19.042 DEGENERATIVE ARTHRITIS OF MIDDLE FINGER OF LEFT HAND: ICD-10-CM

## 2024-10-23 DIAGNOSIS — M19.041 DEGENERATIVE ARTHRITIS OF MIDDLE FINGER OF RIGHT HAND: Primary | ICD-10-CM

## 2024-10-23 PROCEDURE — G8420 CALC BMI NORM PARAMETERS: HCPCS | Performed by: NURSE PRACTITIONER

## 2024-10-23 PROCEDURE — 20600 DRAIN/INJ JOINT/BURSA W/O US: CPT | Performed by: NURSE PRACTITIONER

## 2024-10-23 PROCEDURE — 3017F COLORECTAL CA SCREEN DOC REV: CPT | Performed by: NURSE PRACTITIONER

## 2024-10-23 PROCEDURE — 99214 OFFICE O/P EST MOD 30 MIN: CPT | Performed by: NURSE PRACTITIONER

## 2024-10-23 PROCEDURE — G8427 DOCREV CUR MEDS BY ELIG CLIN: HCPCS | Performed by: NURSE PRACTITIONER

## 2024-10-23 PROCEDURE — G8484 FLU IMMUNIZE NO ADMIN: HCPCS | Performed by: NURSE PRACTITIONER

## 2024-10-23 PROCEDURE — 1160F RVW MEDS BY RX/DR IN RCRD: CPT | Performed by: NURSE PRACTITIONER

## 2024-10-23 PROCEDURE — 1123F ACP DISCUSS/DSCN MKR DOCD: CPT | Performed by: NURSE PRACTITIONER

## 2024-10-23 PROCEDURE — 1036F TOBACCO NON-USER: CPT | Performed by: NURSE PRACTITIONER

## 2024-10-23 PROCEDURE — 1159F MED LIST DOCD IN RCRD: CPT | Performed by: NURSE PRACTITIONER

## 2024-10-23 RX ORDER — BETAMETHASONE SODIUM PHOSPHATE AND BETAMETHASONE ACETATE 3; 3 MG/ML; MG/ML
6 INJECTION, SUSPENSION INTRA-ARTICULAR; INTRALESIONAL; INTRAMUSCULAR; SOFT TISSUE ONCE
Status: COMPLETED | OUTPATIENT
Start: 2024-10-23 | End: 2024-10-23

## 2024-10-23 RX ADMIN — BETAMETHASONE SODIUM PHOSPHATE AND BETAMETHASONE ACETATE 6 MG: 3; 3 INJECTION, SUSPENSION INTRA-ARTICULAR; INTRALESIONAL; INTRAMUSCULAR; SOFT TISSUE at 12:08

## 2024-10-23 NOTE — PROGRESS NOTES
diabetic and call if any concerns. The patient was instructed to call the office if any adverse local effects occurred or any if any questions or concerns arise.     Overall time of this visit taking into account reviewing the chart, face to face time with the patient counseling him on his condition as well as documentation after chart was over 30 minutes.      Patient voiced accordance and understanding of the information provided and the formulated plan. All questions were answered to the patient's satisfaction during the encounter.    4 This is a chronic illness with exacerbation, progression, or side effect of treatment  Treatment at this time: Minor procedure: cortisone injection. I discussed the risk of skin blanching and hyperglycemia.  I discussed the symptoms of hyperglycemia such as confusion, lethargy, polyuria and polydipsia.  If any of these symptoms occur the patient is to present to an urgent care facility or primary care doctor for blood sugar evaluation.    HILDA Gilman - CNP  Orthopaedic Surgery  10/23/24  12:39 PM

## 2024-10-29 ENCOUNTER — OFFICE VISIT (OUTPATIENT)
Dept: FAMILY MEDICINE CLINIC | Facility: CLINIC | Age: 69
End: 2024-10-29

## 2024-10-29 VITALS
BODY MASS INDEX: 20.81 KG/M2 | SYSTOLIC BLOOD PRESSURE: 122 MMHG | DIASTOLIC BLOOD PRESSURE: 76 MMHG | WEIGHT: 145 LBS | OXYGEN SATURATION: 96 % | HEART RATE: 80 BPM | TEMPERATURE: 97.9 F

## 2024-10-29 DIAGNOSIS — R13.19 ESOPHAGEAL DYSPHAGIA: ICD-10-CM

## 2024-10-29 DIAGNOSIS — Z12.5 SPECIAL SCREENING FOR MALIGNANT NEOPLASM OF PROSTATE: ICD-10-CM

## 2024-10-29 DIAGNOSIS — K11.7 EXCESSIVE SALIVATION: ICD-10-CM

## 2024-10-29 DIAGNOSIS — E78.5 HYPERLIPIDEMIA, UNSPECIFIED HYPERLIPIDEMIA TYPE: ICD-10-CM

## 2024-10-29 DIAGNOSIS — I25.810 CORONARY ARTERY DISEASE INVOLVING CORONARY BYPASS GRAFT OF NATIVE HEART WITHOUT ANGINA PECTORIS: ICD-10-CM

## 2024-10-29 DIAGNOSIS — M15.0 PRIMARY OSTEOARTHRITIS INVOLVING MULTIPLE JOINTS: ICD-10-CM

## 2024-10-29 DIAGNOSIS — E03.9 ACQUIRED HYPOTHYROIDISM: ICD-10-CM

## 2024-10-29 DIAGNOSIS — R63.4 WEIGHT LOSS: ICD-10-CM

## 2024-10-29 DIAGNOSIS — Z12.5 SCREENING FOR PROSTATE CANCER: ICD-10-CM

## 2024-10-29 DIAGNOSIS — Z00.00 ENCOUNTER FOR SUBSEQUENT ANNUAL WELLNESS VISIT (AWV) IN MEDICARE PATIENT: Primary | ICD-10-CM

## 2024-10-29 DIAGNOSIS — Z85.810 HISTORY OF TONGUE CANCER: ICD-10-CM

## 2024-10-29 DIAGNOSIS — R53.83 FATIGUE, UNSPECIFIED TYPE: ICD-10-CM

## 2024-10-29 LAB
ALBUMIN SERPL-MCNC: 3.6 G/DL (ref 3.2–4.6)
ALBUMIN/GLOB SERPL: 1.5 (ref 1–1.9)
ALP SERPL-CCNC: 77 U/L (ref 40–129)
ALT SERPL-CCNC: 19 U/L (ref 8–55)
ANION GAP SERPL CALC-SCNC: 8 MMOL/L (ref 7–16)
APPEARANCE UR: CLEAR
AST SERPL-CCNC: 21 U/L (ref 15–37)
BACTERIA URNS QL MICRO: NEGATIVE /HPF
BASOPHILS # BLD: 0 K/UL (ref 0–0.2)
BASOPHILS NFR BLD: 0 % (ref 0–2)
BILIRUB SERPL-MCNC: 0.6 MG/DL (ref 0–1.2)
BILIRUB UR QL: NEGATIVE
BUN SERPL-MCNC: 23 MG/DL (ref 8–23)
CALCIUM SERPL-MCNC: 9.5 MG/DL (ref 8.8–10.2)
CHLORIDE SERPL-SCNC: 103 MMOL/L (ref 98–107)
CHOLEST SERPL-MCNC: 174 MG/DL (ref 0–200)
CO2 SERPL-SCNC: 30 MMOL/L (ref 20–29)
COLOR UR: ABNORMAL
CREAT SERPL-MCNC: 1.06 MG/DL (ref 0.8–1.3)
DIFFERENTIAL METHOD BLD: ABNORMAL
EOSINOPHIL # BLD: 0.2 K/UL (ref 0–0.8)
EOSINOPHIL NFR BLD: 3 % (ref 0.5–7.8)
EPI CELLS #/AREA URNS HPF: ABNORMAL /HPF (ref 0–5)
ERYTHROCYTE [DISTWIDTH] IN BLOOD BY AUTOMATED COUNT: 12.2 % (ref 11.9–14.6)
GLOBULIN SER CALC-MCNC: 2.4 G/DL (ref 2.3–3.5)
GLUCOSE SERPL-MCNC: 97 MG/DL (ref 70–99)
GLUCOSE UR STRIP.AUTO-MCNC: NEGATIVE MG/DL
HCT VFR BLD AUTO: 47.4 % (ref 41.1–50.3)
HDLC SERPL-MCNC: 60 MG/DL (ref 40–60)
HDLC SERPL: 2.9 (ref 0–5)
HGB BLD-MCNC: 15.6 G/DL (ref 13.6–17.2)
HGB UR QL STRIP: NEGATIVE
HYALINE CASTS URNS QL MICRO: ABNORMAL /LPF
IMM GRANULOCYTES # BLD AUTO: 0 K/UL (ref 0–0.5)
IMM GRANULOCYTES NFR BLD AUTO: 0 % (ref 0–5)
KETONES UR QL STRIP.AUTO: NEGATIVE MG/DL
LDLC SERPL CALC-MCNC: 103 MG/DL (ref 0–100)
LEUKOCYTE ESTERASE UR QL STRIP.AUTO: ABNORMAL
LYMPHOCYTES # BLD: 0.7 K/UL (ref 0.5–4.6)
LYMPHOCYTES NFR BLD: 15 % (ref 13–44)
MCH RBC QN AUTO: 33 PG (ref 26.1–32.9)
MCHC RBC AUTO-ENTMCNC: 32.9 G/DL (ref 31.4–35)
MCV RBC AUTO: 100.2 FL (ref 82–102)
MONOCYTES # BLD: 0.5 K/UL (ref 0.1–1.3)
MONOCYTES NFR BLD: 12 % (ref 4–12)
NEUTS SEG # BLD: 3.1 K/UL (ref 1.7–8.2)
NEUTS SEG NFR BLD: 70 % (ref 43–78)
NITRITE UR QL STRIP.AUTO: NEGATIVE
NRBC # BLD: 0 K/UL (ref 0–0.2)
PH UR STRIP: 6.5 (ref 5–9)
PLATELET # BLD AUTO: 174 K/UL (ref 150–450)
PMV BLD AUTO: 10.7 FL (ref 9.4–12.3)
POTASSIUM SERPL-SCNC: 4.9 MMOL/L (ref 3.5–5.1)
PROT SERPL-MCNC: 6.1 G/DL (ref 6.3–8.2)
PROT UR STRIP-MCNC: NEGATIVE MG/DL
PSA SERPL-MCNC: 1.1 NG/ML (ref 0–4)
RBC # BLD AUTO: 4.73 M/UL (ref 4.23–5.6)
RBC #/AREA URNS HPF: ABNORMAL /HPF (ref 0–5)
SODIUM SERPL-SCNC: 140 MMOL/L (ref 136–145)
SP GR UR REFRACTOMETRY: 1.02 (ref 1–1.02)
TRIGL SERPL-MCNC: 55 MG/DL (ref 0–150)
TSH, 3RD GENERATION: 3.05 UIU/ML (ref 0.27–4.2)
UROBILINOGEN UR QL STRIP.AUTO: 0.2 EU/DL (ref 0.2–1)
VLDLC SERPL CALC-MCNC: 11 MG/DL (ref 6–23)
WBC # BLD AUTO: 4.4 K/UL (ref 4.3–11.1)
WBC URNS QL MICRO: ABNORMAL /HPF (ref 0–4)

## 2024-10-29 RX ORDER — GLYCOPYRROLATE 1 MG/1
1 TABLET ORAL 3 TIMES DAILY
Qty: 90 TABLET | Refills: 3 | Status: SHIPPED | OUTPATIENT
Start: 2024-10-29

## 2024-10-29 ASSESSMENT — ENCOUNTER SYMPTOMS
RHINORRHEA: 0
SORE THROAT: 0
GASTROINTESTINAL NEGATIVE: 1
TROUBLE SWALLOWING: 1
SINUS PRESSURE: 0
VOICE CHANGE: 1
RESPIRATORY NEGATIVE: 1
EYES NEGATIVE: 1

## 2024-10-29 ASSESSMENT — PATIENT HEALTH QUESTIONNAIRE - PHQ9
SUM OF ALL RESPONSES TO PHQ QUESTIONS 1-9: 0
1. LITTLE INTEREST OR PLEASURE IN DOING THINGS: NOT AT ALL
SUM OF ALL RESPONSES TO PHQ9 QUESTIONS 1 & 2: 0
2. FEELING DOWN, DEPRESSED OR HOPELESS: NOT AT ALL
SUM OF ALL RESPONSES TO PHQ QUESTIONS 1-9: 0

## 2024-10-29 ASSESSMENT — LIFESTYLE VARIABLES
HOW MANY STANDARD DRINKS CONTAINING ALCOHOL DO YOU HAVE ON A TYPICAL DAY: PATIENT DOES NOT DRINK
HOW OFTEN DO YOU HAVE A DRINK CONTAINING ALCOHOL: NEVER

## 2024-10-29 NOTE — PROGRESS NOTES
Mood and Affect: Mood normal.          ASSESSMENT and PLAN    Easton Montague" was seen today for medicare awv, weight loss, trouble swallowing and excess saliva.    Diagnoses and all orders for this visit:    Encounter for subsequent annual wellness visit (AWV) in Medicare patient    Fatigue, unspecified type  -     CBC with Auto Differential  -     Comprehensive Metabolic Panel  -     Lipid Panel  -     TSH  -     PSA Screening  -     Urinalysis    Hyperlipidemia, unspecified hyperlipidemia type  -     CBC with Auto Differential  -     Comprehensive Metabolic Panel  -     Lipid Panel  -     TSH  -     PSA Screening  -     Urinalysis    Screening for prostate cancer    Coronary artery disease involving coronary bypass graft of native heart without angina pectoris    History of tongue cancer  -     [facility]  -     [facility]    Primary osteoarthritis involving multiple joints    Acquired hypothyroidism  -     CBC with Auto Differential  -     Comprehensive Metabolic Panel  -     Lipid Panel  -     TSH  -     PSA Screening  -     Urinalysis    Special screening for malignant neoplasm of prostate  -     PSA Screening    Excessive salivation  -     glycopyrrolate (ROBINUL) 1 MG tablet; Take 1 tablet by mouth 3 times daily    Weight loss  -     [facility]  -     [facility]    Esophageal dysphagia  -     [facility]  -     [facility]     Reviewed history and symptoms with pt.  Notify lab results  If TSH is low or normal may try stopping to see if this helps with weight. Referral to nutritionist for help with gaining weight.  Reviewed use of Robinul for excessive secretions. If no improvement refer to ENT.  Explained that esophagus may need stretching. Referral to GI to evaluate.    Return

## 2024-10-29 NOTE — PATIENT INSTRUCTIONS
every 1-2 years to screen for glaucoma; cataracts, macular degeneration, and other eye disorders.  A preventive dental visit is recommended every 6 months.  Try to get at least 150 minutes of exercise per week or 10,000 steps per day on a pedometer .  Order or download the FREE \"Exercise & Physical Activity: Your Everyday Guide\" from [employer]. Call 1-275.849.7476 or search [employer] [place] [employer] online.  You need 0319-0223 mg of calcium and 4514-7173 IU of vitamin D per day. It is possible to meet your calcium requirement with diet alone, but a vitamin D supplement is usually necessary to meet this goal.  When exposed to the sun, use a sunscreen that protects against both UVA and UVB radiation with an SPF of 30 or greater. Reapply every 2 to 3 hours or after sweating, drying off with a towel, or swimming.  Always wear a seat belt when traveling in a car. Always wear a helmet when riding a bicycle or motorcycle.

## 2024-11-12 ENCOUNTER — HOSPITAL ENCOUNTER (OUTPATIENT)
Dept: NUTRITION | Age: 69
Discharge: HOME | End: 2024-11-15

## 2024-11-12 PROCEDURE — 97802 MEDICAL NUTRITION INDIV IN: CPT

## 2024-11-12 NOTE — PROGRESS NOTES
handout.  Recommended doubling recipes when pt does cook and prepare food and using during the week (within 5 days refrigerated or to freeze for later) to remove time barrier each evening to prepare foods with the right texture.  Nutrition Education:  Educated pt on strategies for wt gain- many of which pt reports he is doing.  Educated pt on the need for consistent daily intake, setting reminders if he works past a planned time for meal or supplement shake.  Nutrition Counseling  Interviewed pt on motivations and goals for nutrition intake and improving health. See motivations noted in patient interview notes.   Discussed the importance of assessing if pt is consistent with current intake reported as being able to consume. Discussed keeping food logs to write everything down for RD to assess at follow-up appt.  Advised pt that after his appt with GI (pt states on 11/21/24), there may be new information regarding interventions for improving swallow and RD to await and recommendations/ interventions that may be prescribed by GI for pt.     Comprehension: Pt verbalized good understanding of the need for consistent intake and importance of logging foods to monitor progress with consistent intake.    Goal: Pt will consume consistent intake of meals, caloric beverages and Ensure-type supplement drinks to determine current intake and to estimate additional caloric needs for wt gain.  Action Steps:           1. Pt has 10 days of food logs to record time of day, foods eaten, and amounts for RD to calculate current daily intake and estimate additional kcal and protein needs for weight gain.    Goal: Pt will reduce physical activity where he can to reduce the amount of calories needed for his activities throughout the day.   Action Steps:           1. Pt states he can reduce the weight training in the evening and will also look for ways to reduce the activity of the daily work with cutting trees and things he does \"to stay

## 2024-11-20 NOTE — PROGRESS NOTES
Easton Carbajal Sr. is 69 y.o. y/o male here for initial evaluation.     History of Present Illness  The patient presents for evaluation of difficulty swallowing.    He has been experiencing this issue since his surgery. His medical history includes cancer in 2015, which was treated with radiation and chemotherapy. He also has an implant.    To manage his swallowing difficulty, he has been consuming pureed food and adding contaminants. Despite these efforts, he has been losing weight and struggling to gain it back. His active lifestyle requires a high caloric intake, which he is unable to meet due to his swallowing issue. He has consulted with his doctor and a nutritionist about this problem.    He reports that even drinking water is challenging as it tends to come back up. He also experiences a sensation of food getting stuck in his esophagus, particularly when the food contains granules or small pieces, which often leads to choking. Additionally, he notes that sometimes, when he clears his throat in the morning, remnants of his previous night's meal, such as a milkshake or chocolate, come back up.    He has been tracking his caloric intake and has been making an effort to consume more calories through milkshakes. Despite these efforts, he has not seen any significant weight gain. He also mentions that he has to stop and swallow hard while talking.     Doctor's note ()  Easton Carbajal Sr. is 68 y.o. y/o male here for initial evaluation.      Last colonoscopy 10 years ago, denies any acute complaints today, no abdominal pain, nausea, vomiting, change in bowel habits, or signs of GI bleeding.  No FH of colon cancer.      No recent abdominal imaging.     Lab Results   Component Value Date    HGB 15.6 10/29/2024    WBC 4.4 10/29/2024     10/29/2024    .2 10/29/2024    CREATININE 1.06 10/29/2024    ALT 19 10/29/2024    AST 21 10/29/2024       Past Medical History:   Diagnosis

## 2024-11-21 ENCOUNTER — OFFICE VISIT (OUTPATIENT)
Dept: GASTROENTEROLOGY | Age: 69
End: 2024-11-21
Payer: MEDICARE

## 2024-11-21 ENCOUNTER — PREP FOR PROCEDURE (OUTPATIENT)
Dept: GASTROENTEROLOGY | Age: 69
End: 2024-11-21

## 2024-11-21 VITALS
HEIGHT: 70 IN | TEMPERATURE: 98 F | OXYGEN SATURATION: 96 % | DIASTOLIC BLOOD PRESSURE: 61 MMHG | BODY MASS INDEX: 20.87 KG/M2 | SYSTOLIC BLOOD PRESSURE: 115 MMHG | WEIGHT: 145.8 LBS | RESPIRATION RATE: 16 BRPM | HEART RATE: 66 BPM

## 2024-11-21 DIAGNOSIS — Z12.11 ENCOUNTER FOR SCREENING COLONOSCOPY: ICD-10-CM

## 2024-11-21 DIAGNOSIS — Z12.11 COLON CANCER SCREENING: ICD-10-CM

## 2024-11-21 DIAGNOSIS — R13.10 DYSPHAGIA, UNSPECIFIED TYPE: Primary | ICD-10-CM

## 2024-11-21 PROCEDURE — G8427 DOCREV CUR MEDS BY ELIG CLIN: HCPCS | Performed by: STUDENT IN AN ORGANIZED HEALTH CARE EDUCATION/TRAINING PROGRAM

## 2024-11-21 PROCEDURE — G8420 CALC BMI NORM PARAMETERS: HCPCS | Performed by: STUDENT IN AN ORGANIZED HEALTH CARE EDUCATION/TRAINING PROGRAM

## 2024-11-21 PROCEDURE — G8484 FLU IMMUNIZE NO ADMIN: HCPCS | Performed by: STUDENT IN AN ORGANIZED HEALTH CARE EDUCATION/TRAINING PROGRAM

## 2024-11-21 PROCEDURE — 99214 OFFICE O/P EST MOD 30 MIN: CPT | Performed by: STUDENT IN AN ORGANIZED HEALTH CARE EDUCATION/TRAINING PROGRAM

## 2024-11-21 PROCEDURE — 1123F ACP DISCUSS/DSCN MKR DOCD: CPT | Performed by: STUDENT IN AN ORGANIZED HEALTH CARE EDUCATION/TRAINING PROGRAM

## 2024-11-21 PROCEDURE — 1036F TOBACCO NON-USER: CPT | Performed by: STUDENT IN AN ORGANIZED HEALTH CARE EDUCATION/TRAINING PROGRAM

## 2024-11-21 PROCEDURE — 3017F COLORECTAL CA SCREEN DOC REV: CPT | Performed by: STUDENT IN AN ORGANIZED HEALTH CARE EDUCATION/TRAINING PROGRAM

## 2024-11-21 PROCEDURE — 1159F MED LIST DOCD IN RCRD: CPT | Performed by: STUDENT IN AN ORGANIZED HEALTH CARE EDUCATION/TRAINING PROGRAM

## 2024-11-21 PROCEDURE — 1160F RVW MEDS BY RX/DR IN RCRD: CPT | Performed by: STUDENT IN AN ORGANIZED HEALTH CARE EDUCATION/TRAINING PROGRAM

## 2024-11-21 RX ORDER — SODIUM CHLORIDE 0.9 % (FLUSH) 0.9 %
5-40 SYRINGE (ML) INJECTION PRN
Status: CANCELLED | OUTPATIENT
Start: 2024-11-21

## 2024-11-21 RX ORDER — SODIUM CHLORIDE 0.9 % (FLUSH) 0.9 %
5-40 SYRINGE (ML) INJECTION EVERY 12 HOURS SCHEDULED
Status: CANCELLED | OUTPATIENT
Start: 2024-11-21

## 2024-11-21 RX ORDER — SODIUM CHLORIDE 9 MG/ML
25 INJECTION, SOLUTION INTRAVENOUS PRN
Status: CANCELLED | OUTPATIENT
Start: 2024-11-21

## 2024-11-22 NOTE — PERIOP NOTE

## 2024-12-03 NOTE — PROGRESS NOTES
Procedure confirmed with patient. Aware of 1100 arrival time, where to arrive and  policy. Instructed to drink 32 oz of H2O 2 hours pror to leaving home. No questions at this time.

## 2024-12-04 ENCOUNTER — HOSPITAL ENCOUNTER (OUTPATIENT)
Age: 69
Discharge: HOME | End: 2024-12-04
Attending: STUDENT IN AN ORGANIZED HEALTH CARE EDUCATION/TRAINING PROGRAM | Admitting: STUDENT IN AN ORGANIZED HEALTH CARE EDUCATION/TRAINING PROGRAM
Payer: MEDICARE

## 2024-12-04 ENCOUNTER — ANESTHESIA (OUTPATIENT)
Dept: ENDOSCOPY | Age: 69
End: 2024-12-04
Payer: MEDICARE

## 2024-12-04 ENCOUNTER — ANESTHESIA EVENT (OUTPATIENT)
Dept: ENDOSCOPY | Age: 69
End: 2024-12-04
Payer: MEDICARE

## 2024-12-04 VITALS
DIASTOLIC BLOOD PRESSURE: 60 MMHG | TEMPERATURE: 97.9 F | RESPIRATION RATE: 18 BRPM | WEIGHT: 145 LBS | BODY MASS INDEX: 20.76 KG/M2 | HEIGHT: 70 IN | HEART RATE: 65 BPM | OXYGEN SATURATION: 97 % | SYSTOLIC BLOOD PRESSURE: 117 MMHG

## 2024-12-04 PROCEDURE — 3609010600 HC COLONOSCOPY POLYPECTOMY SNARE/COLD BIOPSY: Performed by: STUDENT IN AN ORGANIZED HEALTH CARE EDUCATION/TRAINING PROGRAM

## 2024-12-04 PROCEDURE — 3609016000 HC ESOPHAGOSCOPY DILATION SAVORY: Performed by: STUDENT IN AN ORGANIZED HEALTH CARE EDUCATION/TRAINING PROGRAM

## 2024-12-04 PROCEDURE — 6360000002 HC RX W HCPCS: Performed by: NURSE ANESTHETIST, CERTIFIED REGISTERED

## 2024-12-04 PROCEDURE — 7100000011 HC PHASE II RECOVERY - ADDTL 15 MIN: Performed by: STUDENT IN AN ORGANIZED HEALTH CARE EDUCATION/TRAINING PROGRAM

## 2024-12-04 PROCEDURE — 7100000010 HC PHASE II RECOVERY - FIRST 15 MIN: Performed by: STUDENT IN AN ORGANIZED HEALTH CARE EDUCATION/TRAINING PROGRAM

## 2024-12-04 PROCEDURE — 3700000001 HC ADD 15 MINUTES (ANESTHESIA): Performed by: STUDENT IN AN ORGANIZED HEALTH CARE EDUCATION/TRAINING PROGRAM

## 2024-12-04 PROCEDURE — C1769 GUIDE WIRE: HCPCS | Performed by: STUDENT IN AN ORGANIZED HEALTH CARE EDUCATION/TRAINING PROGRAM

## 2024-12-04 PROCEDURE — 2580000003 HC RX 258: Performed by: REGISTERED NURSE

## 2024-12-04 PROCEDURE — 2500000003 HC RX 250 WO HCPCS: Performed by: NURSE ANESTHETIST, CERTIFIED REGISTERED

## 2024-12-04 PROCEDURE — 6360000002 HC RX W HCPCS: Performed by: REGISTERED NURSE

## 2024-12-04 PROCEDURE — 88305 TISSUE EXAM BY PATHOLOGIST: CPT

## 2024-12-04 PROCEDURE — 2709999900 HC NON-CHARGEABLE SUPPLY: Performed by: STUDENT IN AN ORGANIZED HEALTH CARE EDUCATION/TRAINING PROGRAM

## 2024-12-04 PROCEDURE — 3700000000 HC ANESTHESIA ATTENDED CARE: Performed by: STUDENT IN AN ORGANIZED HEALTH CARE EDUCATION/TRAINING PROGRAM

## 2024-12-04 RX ORDER — LIDOCAINE HYDROCHLORIDE 20 MG/ML
INJECTION, SOLUTION EPIDURAL; INFILTRATION; INTRACAUDAL; PERINEURAL
Status: DISCONTINUED | OUTPATIENT
Start: 2024-12-04 | End: 2024-12-04 | Stop reason: SDUPTHER

## 2024-12-04 RX ORDER — PROPOFOL 10 MG/ML
INJECTION, EMULSION INTRAVENOUS
Status: DISCONTINUED | OUTPATIENT
Start: 2024-12-04 | End: 2024-12-04 | Stop reason: SDUPTHER

## 2024-12-04 RX ORDER — EPHEDRINE SULFATE 5 MG/ML
INJECTION INTRAVENOUS
Status: DISCONTINUED | OUTPATIENT
Start: 2024-12-04 | End: 2024-12-04 | Stop reason: SDUPTHER

## 2024-12-04 RX ADMIN — PHENYLEPHRINE HYDROCHLORIDE 100 MCG: 10 INJECTION INTRAVENOUS at 12:55

## 2024-12-04 RX ADMIN — EPHEDRINE SULFATE 5 MG: 5 INJECTION INTRAVENOUS at 12:58

## 2024-12-04 RX ADMIN — PROPOFOL 50 MG: 10 INJECTION, EMULSION INTRAVENOUS at 12:32

## 2024-12-04 RX ADMIN — PROPOFOL 180 MCG/KG/MIN: 10 INJECTION, EMULSION INTRAVENOUS at 12:33

## 2024-12-04 RX ADMIN — PHENYLEPHRINE HYDROCHLORIDE 100 MCG: 10 INJECTION INTRAVENOUS at 12:52

## 2024-12-04 RX ADMIN — EPHEDRINE SULFATE 5 MG: 5 INJECTION INTRAVENOUS at 12:46

## 2024-12-04 RX ADMIN — EPHEDRINE SULFATE 10 MG: 5 INJECTION INTRAVENOUS at 12:39

## 2024-12-04 RX ADMIN — PROPOFOL 50 MG: 10 INJECTION, EMULSION INTRAVENOUS at 12:34

## 2024-12-04 RX ADMIN — PHENYLEPHRINE HYDROCHLORIDE 100 MCG: 10 INJECTION INTRAVENOUS at 12:53

## 2024-12-04 RX ADMIN — LIDOCAINE HYDROCHLORIDE 80 MG: 20 INJECTION, SOLUTION EPIDURAL; INFILTRATION; INTRACAUDAL; PERINEURAL at 12:32

## 2024-12-04 ASSESSMENT — PAIN - FUNCTIONAL ASSESSMENT
PAIN_FUNCTIONAL_ASSESSMENT: NONE - DENIES PAIN

## 2024-12-04 NOTE — DISCHARGE INSTRUCTIONS
Gastrointestinal Esophagogastroduodenoscopy (EGD) and Colonoscopy- Discharge Instructions    1. Call Dr. Ayala  at [phone] for any problems or questions.    2. Contact the doctor's office for follow up appointment as directed.    3. Medication may cause drowsiness for several hours, therefore, do not drive or operate machinery for remainder of the day.    4. No alcohol today.    5. Do not make any important decisions such as signing legal paperwork.    6. Ordinarily, you may resume regular diet and activity after exam unless otherwise specified by your physician.    7. For mild soreness in your throat you may use Cepacol throat lozenges or warm  salt-water gargles as needed.    8. Because of air put into your colon during exam, you may experience some abdominal distension, relieved by the passage of gas, for several hours.    9. Contact your physician if you have any of the following:  Excessive amount of bleeding - large amount when having a bowel movement.  Occasional specks of blood with bowel movement would not be unusual.  Severe abdominal pain  Fever or Chills    10. Polyp Removal - follow these additional instructions  No Aspirin, Advil, Aleve, Nuprin, Ibuprofen, or medications that contain these drugs for 2 weeks, unless taken for a heart condition.    Any additional instructions:   Resume previous diet. Continue present medications. Patient has a contact number available for emergencies. The signs and symptoms of potential delayed complications were discussed with the patient. Return to normal activities tomorrow. Written discharge instructions were provided to the patient. Await for pathology letter in the next 1-2 weeks through [facility], if not signed up for [facility], we will mail the results letter to your address. If you continue to have trouble swallowing please call back my office in 3-4 weeks.   Resume previous diet. Continue present medications. Patient has a contact number available for

## 2024-12-04 NOTE — ANESTHESIA POSTPROCEDURE EVALUATION
Department of Anesthesiology  Postprocedure Note    Patient: Easton Carbajal Sr.  MRN: [id number]  YOB: 1955  Date of evaluation: 12/4/2024    Procedure Summary       Date: 12/04/24 Room / Location: [facility] ENDO 04 / [facility] ENDOSCOPY    Anesthesia Start: 1227 Anesthesia Stop: 1304    Procedures:       COLONOSCOPY POLYPECTOMY SNARE      ESOPHAGOSCOPY DILATION SAVORY/ biopsies/ dilation 51 F savory (no heme noted post dilation) (Upper GI Region) Diagnosis:       Dysphagia      Encounter for screening colonoscopy      (Dysphagia [R13.10])      (Encounter for screening colonoscopy [Z12.11])    Surgeons: Raya Ayala MD Responsible Provider: Hari Garcia MD    Anesthesia Type: TIVA ASA Status: 3            Anesthesia Type: TIVA    Job Phase I: Job Score: 10    Job Phase II: Job Score: 10    Anesthesia Post Evaluation    Patient location during evaluation: bedside  Patient participation: complete - patient participated  Level of consciousness: awake and alert  Airway patency: patent  Nausea & Vomiting: no nausea  Cardiovascular status: hemodynamically stable  Respiratory status: acceptable, nonlabored ventilation and spontaneous ventilation  Hydration status: euvolemic    No notable events documented.

## 2024-12-04 NOTE — ANESTHESIA PRE PROCEDURE
Department of Anesthesiology  Preprocedure Note       Name:  Easton Carbajal Sr.   Age:  69 y.o.  :  1955                                          MRN:  [id number]         Date:  2024      Surgeon: Surgeon(s):  Raya Ayala MD    Procedure: Procedure(s):  COLORECTAL CANCER SCREENING, NOT HIGH RISK  ESOPHAGOGASTRODUODENOSCOPY    Medications prior to admission:   Prior to Admission medications    Medication Sig Start Date End Date Taking? Authorizing Provider   levothyroxine (SYNTHROID) 75 MCG tablet Take 1 tablet by mouth every morning (before breakfast) 24  Yes Easton Salas Jr., MD   meloxicam (MOBIC) 15 MG tablet Take 1 tablet by mouth daily 24 Yes Giovanna Dunlap APRN - VIKY   diclofenac sodium (VOLTAREN) 1 % GEL Apply 2 g topically 3 times daily for 28 days 24 Yes Giovanna Dunlap APRN - CNP   glycopyrrolate (ROBINUL) 1 MG tablet Take 1 tablet by mouth 3 times daily  Patient not taking: Reported on 2024 10/29/24   Easton Salas Jr., MD   atropine 1 % ophthalmic solution Place 1 drop under the tongue 3 times daily one or two drops (0.5mg to 1mg), two - three times a day, under the tongue. Ignore the printing on the box and container referring to use as eye drops.  Patient not taking: Reported on 2024 4/15/24   Deshawn Smalls DO   scopolamine (TRANSDERM-SCOP) transdermal patch Place 1 patch onto the skin every 72 hours  Patient not taking: Reported on 2024   Easton Salas Jr., MD   megestrol (MEGACE ES) 625 MG/5ML suspension Take 5 mLs by mouth daily  Patient not taking: Reported on 3/27/2024 10/27/23   Easton Salas Jr., MD   traZODone (DESYREL) 100 MG tablet Take 0.5-1 tablets by mouth nightly as needed for Sleep  Patient not taking: Reported on 3/27/2024 10/7/22   Easton Salas Jr., MD   atorvastatin (LIPITOR) 10 MG tablet Take 1 tablet by mouth daily  Patient not taking: Reported on 10/27/2023 10/1/21

## 2024-12-04 NOTE — H&P
Easton Carbajal Sr. is 69 y.o. y/o male here for initial evaluation.      History of Present Illness  The patient presents for evaluation of difficulty swallowing.     He has been experiencing this issue since his surgery. His medical history includes cancer in 2015, which was treated with radiation and chemotherapy. He also has an implant.     To manage his swallowing difficulty, he has been consuming pureed food and adding contaminants. Despite these efforts, he has been losing weight and struggling to gain it back. His active lifestyle requires a high caloric intake, which he is unable to meet due to his swallowing issue. He has consulted with his doctor and a nutritionist about this problem.     He reports that even drinking water is challenging as it tends to come back up. He also experiences a sensation of food getting stuck in his esophagus, particularly when the food contains granules or small pieces, which often leads to choking. Additionally, he notes that sometimes, when he clears his throat in the morning, remnants of his previous night's meal, such as a milkshake or chocolate, come back up.     He has been tracking his caloric intake and has been making an effort to consume more calories through milkshakes. Despite these efforts, he has not seen any significant weight gain. He also mentions that he has to stop and swallow hard while talking.     Doctor's note ()  Easton Carbajal Sr. is 68 y.o. y/o male here for initial evaluation.      Last colonoscopy 10 years ago, denies any acute complaints today, no abdominal pain, nausea, vomiting, change in bowel habits, or signs of GI bleeding.  No FH of colon cancer.        No recent abdominal imaging.            Lab Results   Component Value Date     HGB 15.6 10/29/2024     WBC 4.4 10/29/2024      10/29/2024     .2 10/29/2024     CREATININE 1.06 10/29/2024     ALT 19 10/29/2024     AST 21 10/29/2024         Past Medical

## 2024-12-11 ENCOUNTER — HOSPITAL ENCOUNTER (OUTPATIENT)
Dept: NUTRITION | Age: 69
Discharge: HOME | End: 2024-12-14

## 2024-12-11 ENCOUNTER — TELEPHONE (OUTPATIENT)
Dept: GASTROENTEROLOGY | Age: 69
End: 2024-12-11

## 2024-12-11 NOTE — PROGRESS NOTES
Azalea Rodriguez, MS, RD, CSSD, LD  Outpatient Registered Dietitian  [facility]  Phone: [phone] Fax: [phone]    F/U ASSESSMENT  Pt is a 69 y.o. male returning for a f/u appt. Referred with the following diagnosis (es): Personal history of malignant neoplasm of tongue [Z85.810]  Abnormal weight loss [R63.4] Other dysphagia [R13.19]   PMH includes CAD, CABG x4, HLD, hypothyroidism, esophageal dysphagia with stretching required.    Goal: Pt will consume consistent intake of meals, caloric beverages and Ensure-type supplement drinks to determine current intake and to estimate additional caloric needs for wt gain. Action phase- minimal progress towards goal.  Action Steps:           1. Pt has 10 days of food logs to record time of day, foods eaten, and amounts for RD to calculate current daily intake and estimate additional kcal and protein needs for weight gain.     Goal: Pt will reduce physical activity where he can to reduce the amount of calories needed for his activities throughout the day. Action phase- goal met  Action Steps:           1. Pt states he can reduce the weight training in the evening and will also look for ways to reduce the activity of the daily work with cutting trees and things he does \"to stay active.\"   WT gain progress:  Weight as of 12/4/24:      65.8 kg (145 lb).  Weight measured today: 67.6 kg (148.8lb) (w/ boots on).     Patient Interview Notes:   Pt reports he had a recent colonoscopy and EGD, with the colonoscopy prep \"setting him back some.\" States he may have lost a little ground with wt gain with 2 days of intake minimal. States he received a notice of a result emailed as posted in My Chart. Pt states he read that there may be possibility of EoE and then researched the condition and expressed concerns for recommendations to eliminate foods that he currently is consuming to help with wt gain. Had questions regarding EoE for dietitian. Pt

## 2024-12-18 ENCOUNTER — TELEPHONE (OUTPATIENT)
Dept: NUTRITION | Age: 69
End: 2024-12-18

## 2024-12-18 NOTE — TELEPHONE ENCOUNTER
[facility]: Pt called to update RD that he is awaiting a call from provider for results from a recent procedure and will proceed with nutrition counseling here if and as indicated.     No Rodriguez MS, RD, CSSD, LD

## 2024-12-21 PROBLEM — Z12.11 ENCOUNTER FOR SCREENING COLONOSCOPY: Status: RESOLVED | Noted: 2024-11-21 | Resolved: 2024-12-21

## 2025-01-01 ENCOUNTER — RESULTS FOLLOW-UP (OUTPATIENT)
Dept: FAMILY MEDICINE CLINIC | Facility: CLINIC | Age: 70
End: 2025-01-01

## 2025-01-01 ENCOUNTER — PREP FOR PROCEDURE (OUTPATIENT)
Age: 70
End: 2025-01-01

## 2025-01-01 ENCOUNTER — CARE COORDINATION (OUTPATIENT)
Dept: CARE COORDINATION | Facility: CLINIC | Age: 70
End: 2025-01-01

## 2025-01-01 DIAGNOSIS — F32.A MILD DEPRESSION: ICD-10-CM

## 2025-01-01 RX ORDER — SODIUM CHLORIDE 0.9 % (FLUSH) 0.9 %
5-40 SYRINGE (ML) INJECTION EVERY 12 HOURS SCHEDULED
Status: CANCELLED | OUTPATIENT
Start: 2025-01-01

## 2025-01-01 RX ORDER — SODIUM CHLORIDE 0.9 % (FLUSH) 0.9 %
5-40 SYRINGE (ML) INJECTION PRN
Status: CANCELLED | OUTPATIENT
Start: 2025-01-01

## 2025-01-01 RX ORDER — SODIUM CHLORIDE 9 MG/ML
25 INJECTION, SOLUTION INTRAVENOUS PRN
Status: CANCELLED | OUTPATIENT
Start: 2025-01-01

## 2025-01-08 ENCOUNTER — OFFICE VISIT (OUTPATIENT)
Dept: GASTROENTEROLOGY | Age: 70
End: 2025-01-08
Payer: MEDICARE

## 2025-01-08 VITALS
SYSTOLIC BLOOD PRESSURE: 118 MMHG | WEIGHT: 150 LBS | RESPIRATION RATE: 18 BRPM | HEART RATE: 57 BPM | DIASTOLIC BLOOD PRESSURE: 68 MMHG | BODY MASS INDEX: 21.52 KG/M2 | OXYGEN SATURATION: 96 %

## 2025-01-08 DIAGNOSIS — K20.0 EOSINOPHILIC ESOPHAGITIS: Primary | ICD-10-CM

## 2025-01-08 DIAGNOSIS — R13.19 ESOPHAGEAL DYSPHAGIA: ICD-10-CM

## 2025-01-08 DIAGNOSIS — K22.70 BARRETT'S ESOPHAGUS WITHOUT DYSPLASIA: ICD-10-CM

## 2025-01-08 PROCEDURE — 1123F ACP DISCUSS/DSCN MKR DOCD: CPT

## 2025-01-08 PROCEDURE — M1308 PR FLU IMMUNIZE NO ADMIN: HCPCS

## 2025-01-08 PROCEDURE — 99213 OFFICE O/P EST LOW 20 MIN: CPT

## 2025-01-08 PROCEDURE — 1159F MED LIST DOCD IN RCRD: CPT

## 2025-01-08 PROCEDURE — 1036F TOBACCO NON-USER: CPT

## 2025-01-08 PROCEDURE — 3017F COLORECTAL CA SCREEN DOC REV: CPT

## 2025-01-08 PROCEDURE — G8420 CALC BMI NORM PARAMETERS: HCPCS

## 2025-01-08 PROCEDURE — G8427 DOCREV CUR MEDS BY ELIG CLIN: HCPCS

## 2025-01-08 RX ORDER — OMEPRAZOLE 40 MG/1
40 CAPSULE, DELAYED RELEASE ORAL
Qty: 90 CAPSULE | Refills: 1 | Status: SHIPPED | OUTPATIENT
Start: 2025-01-08

## 2025-01-08 ASSESSMENT — ENCOUNTER SYMPTOMS
BLOOD IN STOOL: 0
DIARRHEA: 0
ABDOMINAL PAIN: 0
NAUSEA: 1
CONSTIPATION: 0
TROUBLE SWALLOWING: 1
VOMITING: 0

## 2025-01-08 NOTE — ASSESSMENT & PLAN NOTE
Feels like he cannot push the bolus down. Hx of H+N CA with radiation tx; now in remission. Will plan for manometry to rule out motility issue.

## 2025-01-08 NOTE — PATIENT INSTRUCTIONS
-I will start you on Protonix 40 mg daily; please take this everyday 30 minutes before breakfast on an empty stomach. Continue to follow up with your dietician.    -If you continue to have issues on once a day dosing of Protonix, let our office know and we can go up to twice a day dosing.    -I will schedule for a manometry as well to see if any issues with your esophageal contractions, which may be causing your trouble swallowing.    -For your Bolden's, we will plan to repeat your EGD in 3 years to make sure it has not developed in pre-cancer or cancer. Please continue to take the Protonix 40 mg daily.

## 2025-01-08 NOTE — PROGRESS NOTES
Easton Tillmaneric Baxter. (:  1955) is a 69 y.o. male,Established patient, here for evaluation of the following chief complaint(s):  Follow-up and Dysphagia (Unable to swallow solids, has lost 20-25 pounds over a year )       Assessment & Plan  Eosinophilic esophagitis  Path showing up to 15 eosinophils per hpf on EGD path. Not clear if this is EoE given criteria says >/= 15. Patient still cannot tolerate food; he is drinking a lot of milkshakes, eggs and unsure if this is triggering his symptoms. Will start on Prilosec 40 mg daily, can increase to BID if no relief. He follows with a dietitian.     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 New criteria now showing 3 year repeat EGD if <2 cm area (no obvious area noted on EGD?).    Orders:    omeprazole (PRILOSEC) 40 MG delayed release capsule; Take 1 capsule by mouth every morning (before breakfast) Take 30 minutes before breakfast on an empty stomach with water only    Esophageal dysphagia  Feels like he cannot push the bolus down. Hx of H+N CA with radiation tx; now in remission. Will plan for manometry to rule out motility issue.           No follow-ups on file.       Subjective   HPI    Pt follows with Dr. Ayala. Last seen in November for dysphagia to food/liquids. Also planning for screening colonoscopy.     He had an EGD on  that showed only gastritis and irregular Z-line, but esophageal abnormalities. He was empirically dilated. Colonoscopy showing sigmoid diverticulosis, internal hemorrhoids, 2x polyps in the transverse colon. Pathology showing sessile serrated adenoma. 5 year repeat recommended.      EGD path showing Bolden's without dysplasia and showing 15 eosinophils per hpf; consistent with EoE.      Today: He has followed

## 2025-01-13 NOTE — PROGRESS NOTES
Called and spoke with patient via telephone to explain 0900 arrival time for 0930 Manometry. Patient stated understanding and is going to [address] to the 2nd floor to be checked in by radiology.

## 2025-01-14 ENCOUNTER — HOSPITAL ENCOUNTER (OUTPATIENT)
Dept: ENDOSCOPY | Age: 70
Discharge: HOME | End: 2025-01-17
Payer: MEDICARE

## 2025-01-14 PROCEDURE — 6370000000 HC RX 637 (ALT 250 FOR IP): Performed by: STUDENT IN AN ORGANIZED HEALTH CARE EDUCATION/TRAINING PROGRAM

## 2025-01-14 PROCEDURE — 3609015500 HC GASTRIC/DUODENAL MOTILITY &/OR MANOMETRY STUDY

## 2025-01-14 RX ORDER — LIDOCAINE HYDROCHLORIDE 20 MG/ML
15 SOLUTION OROPHARYNGEAL
Status: DISCONTINUED | OUTPATIENT
Start: 2025-01-14 | End: 2025-01-18 | Stop reason: HOSPADM

## 2025-01-14 RX ADMIN — LIDOCAINE HYDROCHLORIDE 15 ML: 20 SOLUTION ORAL at 09:55

## 2025-01-23 ENCOUNTER — OFFICE VISIT (OUTPATIENT)
Age: 70
End: 2025-01-23

## 2025-01-23 DIAGNOSIS — M19.042 DEGENERATIVE ARTHRITIS OF MIDDLE FINGER OF LEFT HAND: ICD-10-CM

## 2025-01-23 DIAGNOSIS — M19.041 DEGENERATIVE ARTHRITIS OF MIDDLE FINGER OF RIGHT HAND: Primary | ICD-10-CM

## 2025-01-23 RX ORDER — BETAMETHASONE SODIUM PHOSPHATE AND BETAMETHASONE ACETATE 3; 3 MG/ML; MG/ML
6 INJECTION, SUSPENSION INTRA-ARTICULAR; INTRALESIONAL; INTRAMUSCULAR; SOFT TISSUE ONCE
Status: COMPLETED | OUTPATIENT
Start: 2025-01-23 | End: 2025-01-23

## 2025-01-23 RX ADMIN — BETAMETHASONE SODIUM PHOSPHATE AND BETAMETHASONE ACETATE 6 MG: 3; 3 INJECTION, SUSPENSION INTRA-ARTICULAR; INTRALESIONAL; INTRAMUSCULAR; SOFT TISSUE at 11:11

## 2025-01-23 NOTE — PROGRESS NOTES
[facility]thopaedic Hand Clinic Note    Name: Easton Carbajal Sr.  YOB: 1955  Gender: male  MRN: [id number]      Follow up visit:   1. Degenerative arthritis of middle finger of right hand    2. Degenerative arthritis of middle finger of left hand        HPI: Easton Carbajal Sr. is a 69 y.o. male who is following up for bilateral index and middle finger MCP arthritis.  He was last seen 3 months ago.  He received injections in the bilateral index and bilateral middle finger MCP joints.  He reports the right middle finger MCP joint is the most painful.  He said his index fingers are doing well at the moment.    ROS/Meds/PSH/PMH/FH/SH: I personally reviewed the patients standard intake form.  Pertinents are discussed in the HPI    Physical Examination:    Musculoskeletal Examination:  Examination on the bilateral upper extremity demonstrates cap refill < 5 seconds in all fingers  Patient is able to make bilateral composite fist.  He has full extension of his digits.  He is tender palpation at the MCP joints of the bilateral middle fingers.  He denies paresthesia.  He has good capillary refill.    Imaging / Electrodiagnostic Tests:     None    Assessment:     ICD-10-CM    1. Degenerative arthritis of middle finger of right hand  M19.041 betamethasone acetate-betamethasone sodium phosphate (CELESTONE) injection 6 mg      2. Degenerative arthritis of middle finger of left hand  M19.042 betamethasone acetate-betamethasone sodium phosphate (CELESTONE) injection 6 mg          Plan:   We discussed the diagnosis and different treatment options. We discussed observation, therapy, antiinflammatory medications and other pertinent treatment modalities.    After discussing in detail the patient elects to proceed with injections for the bilateral middle finger MCP joints.  I will see him back in 3 months.    Procedure Note    The risk, benefits and alternatives of injection and no injection therapy were

## 2025-01-24 DIAGNOSIS — K11.7 EXCESSIVE SALIVATION: ICD-10-CM

## 2025-01-24 RX ORDER — GLYCOPYRROLATE 1 MG/1
1 TABLET ORAL 3 TIMES DAILY
Qty: 270 TABLET | Refills: 1 | OUTPATIENT
Start: 2025-01-24

## 2025-02-03 ENCOUNTER — APPOINTMENT (OUTPATIENT)
Dept: URBAN - METROPOLITAN AREA CLINIC 329 | Facility: CLINIC | Age: 70
Setting detail: DERMATOLOGY
End: 2025-02-03

## 2025-02-03 DIAGNOSIS — Z85.828 PERSONAL HISTORY OF OTHER MALIGNANT NEOPLASM OF SKIN: ICD-10-CM

## 2025-02-03 DIAGNOSIS — D22 MELANOCYTIC NEVI: ICD-10-CM

## 2025-02-03 DIAGNOSIS — D18.0 HEMANGIOMA: ICD-10-CM

## 2025-02-03 DIAGNOSIS — L82.1 OTHER SEBORRHEIC KERATOSIS: ICD-10-CM

## 2025-02-03 DIAGNOSIS — L81.4 OTHER MELANIN HYPERPIGMENTATION: ICD-10-CM

## 2025-02-03 DIAGNOSIS — L57.0 ACTINIC KERATOSIS: ICD-10-CM

## 2025-02-03 DIAGNOSIS — L85.3 XEROSIS CUTIS: ICD-10-CM

## 2025-02-03 PROBLEM — D22.9 MELANOCYTIC NEVI, UNSPECIFIED: Status: ACTIVE | Noted: 2025-02-03

## 2025-02-03 PROBLEM — D48.5 NEOPLASM OF UNCERTAIN BEHAVIOR OF SKIN: Status: ACTIVE | Noted: 2025-02-03

## 2025-02-03 PROBLEM — D18.01 HEMANGIOMA OF SKIN AND SUBCUTANEOUS TISSUE: Status: ACTIVE | Noted: 2025-02-03

## 2025-02-03 PROCEDURE — ? COUNSELING

## 2025-02-03 PROCEDURE — 17000 DESTRUCT PREMALG LESION: CPT | Mod: 59

## 2025-02-03 PROCEDURE — ? TREATMENT REGIMEN

## 2025-02-03 PROCEDURE — 11103 TANGNTL BX SKIN EA SEP/ADDL: CPT

## 2025-02-03 PROCEDURE — 99213 OFFICE O/P EST LOW 20 MIN: CPT | Mod: 25

## 2025-02-03 PROCEDURE — ? BIOPSY BY SHAVE METHOD

## 2025-02-03 PROCEDURE — 11102 TANGNTL BX SKIN SINGLE LES: CPT

## 2025-02-03 PROCEDURE — ? LIQUID NITROGEN

## 2025-02-03 ASSESSMENT — LOCATION ZONE DERM
LOCATION ZONE: ARM
LOCATION ZONE: NOSE

## 2025-02-03 ASSESSMENT — LOCATION DETAILED DESCRIPTION DERM
LOCATION DETAILED: NASAL DORSUM
LOCATION DETAILED: NASAL SUPRATIP
LOCATION DETAILED: RIGHT PROXIMAL DORSAL FOREARM

## 2025-02-03 ASSESSMENT — LOCATION SIMPLE DESCRIPTION DERM
LOCATION SIMPLE: RIGHT FOREARM
LOCATION SIMPLE: NOSE

## 2025-02-03 NOTE — HPI: EVALUATION OF SKIN LESION(S)
Hpi Title: Evaluation of Skin Lesions
How Severe Are Your Spot(S)?: mild
Have Your Spot(S) Been Treated In The Past?: has not been treated
I have personally performed a history and physical examination of the patient and discussed management with the PAUL as well as the patient.  I reviewed the PAUL's note and agree with the documented findings and plan of care.  I have authored and modified critical sections of the Provider Note, including but not limited to HPI, Physical Exam and MDM.      49 y/o female pmh migraines, hld c/o L eye blurry vision. Pt admits to blurry vision to L eye which progressed to visual loss in upper half of her field of vision. Pt was seen by Optho who rec to place pt in CDU for MRI brain/orbit. Pt was found to have signs concerning for optic neuritis on dilated exam and MR found white matter lesions concerning for MS. Pt received 1G solumedrol qd x 3d as per opthalmology and is to be admitted for further workup and management. Pt verbalized understanding and is in agreement with treatment plan.

## 2025-02-04 ENCOUNTER — TELEPHONE (OUTPATIENT)
Age: 70
End: 2025-02-04

## 2025-02-04 RX ORDER — OMEPRAZOLE 40 MG/1
40 CAPSULE, DELAYED RELEASE ORAL
Qty: 60 CAPSULE | Refills: 2 | Status: CANCELLED | OUTPATIENT
Start: 2025-02-04

## 2025-02-04 NOTE — TELEPHONE ENCOUNTER
Called pt as instructed by Dr. Guzman to review Manometry results and schedule VV 2mo f/u. Relayed results and recommendations to pt from provider as outlined below. Pt verbalized understanding, agreeable to provider recommendations.     Instructed pt to begin taking his PPI twice daily, 30 min prior to breakfast and dinner, per Dr. Guzman. New rx for PPI sent in updated for BID dosing, pt to request fill once he completes current bottle.     Offered for pt to keep his currently scheduled visit with Dr. Ayala 2/27/25 if he has concerns that he wants to discuss in person with provider instead of 2mo follow up with Dr. Guzman. Pt declined, stating he understands results reviewed today and wants to proceed with 2mo follow up to assess response to new medication dosing as advised by Dr. Guzman. Canceled pt's appt and scheduled new appt as VV on 4/4/25 at 1030. Instructed pt to reach out with any questions or worsening sx prior to visit, pt agreeable.         ---------------------------------------------------    Staff message 2/2/25:  \"Marla Guzman MD Lukhard, Amanda, RN  Please review with patient. Increase to twice daily PPI.  VV in 2 months.\"      Manometry Op Note:  \"Postoperative Diagnosis: Abnormal Esophageal manometry study.      Impression:  Abnormal. Findings suggestive of  ineffective esophageal dysmotility, possibly secondary to prior radiation therapy to head/neck for cancer. Patient also had prior esophageal biopsies significant for intraepithelial eosinophils 15 per hpf consistent with EoE.       Recommendations: Increase PPI to twice daily for treatment of EoE. Follow up in the clinic to assess symptom improvement.\"

## 2025-02-12 ENCOUNTER — APPOINTMENT (OUTPATIENT)
Dept: URBAN - METROPOLITAN AREA CLINIC 329 | Facility: CLINIC | Age: 70
Setting detail: DERMATOLOGY
End: 2025-02-12

## 2025-02-12 PROBLEM — C44.91 BASAL CELL CARCINOMA OF SKIN, UNSPECIFIED: Status: ACTIVE | Noted: 2025-02-12

## 2025-02-12 PROCEDURE — ? REFERRAL

## 2025-02-13 ENCOUNTER — TELEPHONE (OUTPATIENT)
Dept: FAMILY MEDICINE CLINIC | Facility: CLINIC | Age: 70
End: 2025-02-13

## 2025-02-13 RX ORDER — OSELTAMIVIR PHOSPHATE 75 MG/1
75 CAPSULE ORAL 2 TIMES DAILY
Qty: 10 CAPSULE | Refills: 0 | Status: SHIPPED | OUTPATIENT
Start: 2025-02-13 | End: 2025-02-18

## 2025-02-13 NOTE — TELEPHONE ENCOUNTER
Patient is positive for flu and wants to know what he can do to help. He's using OTC meds. Please call patient at [phone].

## 2025-02-17 ENCOUNTER — TELEPHONE (OUTPATIENT)
Age: 70
End: 2025-02-17

## 2025-02-17 NOTE — TELEPHONE ENCOUNTER
Per JAYANT Aguilar, pt called clinic again specifically asking about omeprazole 40mg as previously switched to BID dosing by Dr. Guzman. Pt stating that he is having trouble swallowing the omeprazole twice daily and wants recommendations from Dr. Guzman/wants to know if an alternate medication or liquid can be sent in. Message to be routed to Dr. Guzman to advise. JAYANT Aguilar told pt that Dr. Guzman not in clinic today but we should get response and be able to call back with updated plan from provider within next couple days. Pt agreeable.

## 2025-02-17 NOTE — TELEPHONE ENCOUNTER
Pt called; needs to speak with providers RN/MA; cannot take medications due to difficulty swallowing.

## 2025-02-18 ENCOUNTER — TELEPHONE (OUTPATIENT)
Age: 70
End: 2025-02-18

## 2025-02-18 ENCOUNTER — TELEMEDICINE (OUTPATIENT)
Age: 70
End: 2025-02-18
Payer: MEDICARE

## 2025-02-18 DIAGNOSIS — R13.19 ESOPHAGEAL DYSPHAGIA: Primary | ICD-10-CM

## 2025-02-18 PROCEDURE — 1123F ACP DISCUSS/DSCN MKR DOCD: CPT | Performed by: INTERNAL MEDICINE

## 2025-02-18 PROCEDURE — G8428 CUR MEDS NOT DOCUMENT: HCPCS | Performed by: INTERNAL MEDICINE

## 2025-02-18 PROCEDURE — 99214 OFFICE O/P EST MOD 30 MIN: CPT | Performed by: INTERNAL MEDICINE

## 2025-02-18 PROCEDURE — G8420 CALC BMI NORM PARAMETERS: HCPCS | Performed by: INTERNAL MEDICINE

## 2025-02-18 PROCEDURE — 1036F TOBACCO NON-USER: CPT | Performed by: INTERNAL MEDICINE

## 2025-02-18 PROCEDURE — 3017F COLORECTAL CA SCREEN DOC REV: CPT | Performed by: INTERNAL MEDICINE

## 2025-02-18 NOTE — TELEPHONE ENCOUNTER
Per staff message from Dr. Guzman schedule patient for EGD with dilation. Called and left a voicemail for the patient to return call.

## 2025-02-18 NOTE — PROGRESS NOTES
Easton Carbajal Sr., was evaluated through a synchronous (real-time) audio-video encounter. The patient (or guardian if applicable) is aware that this is a billable service, which includes applicable co-pays. This Virtual Visit was conducted with patient's (and/or legal guardian's) consent. Patient identification was verified, and a caregiver was present when appropriate.   The patient was located at Home: [address] [facility] [id number]  Provider was located at Facility (Appt Dept): [address] , [address]  [place] [address]  Confirm you are appropriately licensed, registered, or certified to deliver care in the state where the patient is located as indicated above. If you are not or unsure, please re-schedule the visit: Yes, I confirm.     Easton Carbajal Sr. (:  1955) is a Established patient, presenting virtually for evaluation of the following:    Dysphagia    Patient presents with complaints of worsening dysphagia. He has had difficulty swallowing pills. He is feeling the pills get lodge in his throat. He has been unable to take his Omeprazole. He follows a liquid/pureed diet, which he hs followed for many years. Carbonated beverages help wash down food. After most recent EGD/dilation he did feel an improvement in symptoms.     Below is the assessment and plan developed based on review of pertinent history, physical exam, labs, studies, and medications.    Assessment and Plan:   Chronic dysphagia. Previous EGD with empiric dilation with some improvement. Previous esophageal biopsies showed intraepithelial eosinophils 15 per hpf. Esophageal manometry significant for ineffective esophageal dysmotility.      - Open Omeprazole capsule twice daily    - Repeat EGD with esophageal biopsies and dilation       On this date 2025 I have spent 30 minutes reviewing previous notes, test results and face to face (virtual) with the patient discussing the diagnosis and

## 2025-02-19 ENCOUNTER — TELEPHONE (OUTPATIENT)
Age: 70
End: 2025-02-19

## 2025-02-19 ENCOUNTER — PREP FOR PROCEDURE (OUTPATIENT)
Age: 70
End: 2025-02-19

## 2025-02-19 DIAGNOSIS — R13.19 ESOPHAGEAL DYSPHAGIA: ICD-10-CM

## 2025-02-19 RX ORDER — SODIUM CHLORIDE 0.9 % (FLUSH) 0.9 %
5-40 SYRINGE (ML) INJECTION EVERY 12 HOURS SCHEDULED
Status: CANCELLED | OUTPATIENT
Start: 2025-02-19

## 2025-02-19 RX ORDER — SODIUM CHLORIDE 9 MG/ML
25 INJECTION, SOLUTION INTRAVENOUS PRN
Status: CANCELLED | OUTPATIENT
Start: 2025-02-19

## 2025-02-19 RX ORDER — SODIUM CHLORIDE 0.9 % (FLUSH) 0.9 %
5-40 SYRINGE (ML) INJECTION PRN
Status: CANCELLED | OUTPATIENT
Start: 2025-02-19

## 2025-02-19 NOTE — TELEPHONE ENCOUNTER
Per staff message from Dr. Guzman schedule patient for EGD with dilation. The patient returned call and scheduled Wednesday 3/19/2025 at [facility]. Reviewed instructions with the patient.     The day before your test->     You should NOT eat or drink after midnight.

## 2025-03-04 ENCOUNTER — TELEPHONE (OUTPATIENT)
Dept: GASTROENTEROLOGY | Age: 70
End: 2025-03-04

## 2025-03-04 DIAGNOSIS — R13.10 DYSPHAGIA, UNSPECIFIED TYPE: ICD-10-CM

## 2025-03-04 DIAGNOSIS — K20.0 EOSINOPHILIC ESOPHAGITIS: Primary | ICD-10-CM

## 2025-03-04 DIAGNOSIS — K21.00 GASTROESOPHAGEAL REFLUX DISEASE WITH ESOPHAGITIS WITHOUT HEMORRHAGE: ICD-10-CM

## 2025-03-04 DIAGNOSIS — K22.4 ESOPHAGEAL DYSMOTILITY: ICD-10-CM

## 2025-03-04 DIAGNOSIS — R13.19 ESOPHAGEAL DYSPHAGIA: ICD-10-CM

## 2025-03-04 RX ORDER — OMEPRAZOLE 40 MG/1
40 CAPSULE, DELAYED RELEASE ORAL
Qty: 180 CAPSULE | Refills: 1 | Status: SHIPPED | OUTPATIENT
Start: 2025-03-04

## 2025-03-04 NOTE — TELEPHONE ENCOUNTER
Patient called and stated that a few weeks ago the pharmacy tried to give him a refill on omeprazole, but he did not have the money to cover it at the time after paying for Tamiflu. Patient went back yesterday to pick it up after recovering from the flu and the pharmacy stated they do not have a refill on file at all. Patient has been taking the omeprazole twice daily since the VV and it has helped but he only has a few days worth left. Please advise.    no

## 2025-03-04 NOTE — TELEPHONE ENCOUNTER
Per Dr. Guzman's last OV note 2/18/25, order for omeprazole 40mg capsules BID called into pt's pharmacy. Pt to take twice daily prior to meals by opening capsule per Dr. Guzman.

## 2025-03-10 NOTE — PERIOP NOTE
Patient verified name, , and procedure.    Type: 1a; abbreviated assessment per anesthesia guidelines  Labs per surgeon: none ordered  Labs per anesthesia: not indicated      Instructed pt that they will be notified by the Gi Lab for time of arrival. If any questions please call the GI lab at [phone].    Follow diet and prep instructions per office. May have clear liquids until 2 hours prior to time of arrival.    Bath or shower the night before and the am of surgery with antibacterial soap. No lotions, oils, powders, cologne on skin. No make up, eye make up or jewelry. Wear loose fitting comfortable, clean clothing.     Must have adult present in building the entire time .     Medications for the day of procedure:  levothyroxine, omeprazole.  Patient stated he has to take medication mixed in apple sauce.  Patient instructed to take medications with clear liquids.      The following discharge instructions reviewed with patient: medication given during procedure may cause drowsiness for several hours, therefore, do not drive or operate machinery for remainder of the day, no alcohol on the day of your procedure, resume regular diet and activity unless otherwise directed, for mild sore throat you may use Cepacol throat lozenges or warm salt water gargles as needed, call your physician for any problems or questions. Patient verbalizes understanding.

## 2025-03-18 ENCOUNTER — ANESTHESIA EVENT (OUTPATIENT)
Dept: ENDOSCOPY | Age: 70
End: 2025-03-18
Payer: MEDICARE

## 2025-03-18 RX ORDER — SODIUM CHLORIDE 0.9 % (FLUSH) 0.9 %
5-40 SYRINGE (ML) INJECTION PRN
Status: CANCELLED | OUTPATIENT
Start: 2025-03-18

## 2025-03-18 RX ORDER — SODIUM CHLORIDE 9 MG/ML
INJECTION, SOLUTION INTRAVENOUS PRN
Status: CANCELLED | OUTPATIENT
Start: 2025-03-18

## 2025-03-18 RX ORDER — SODIUM CHLORIDE 0.9 % (FLUSH) 0.9 %
5-40 SYRINGE (ML) INJECTION EVERY 12 HOURS SCHEDULED
Status: CANCELLED | OUTPATIENT
Start: 2025-03-18

## 2025-03-19 ENCOUNTER — ANESTHESIA (OUTPATIENT)
Dept: ENDOSCOPY | Age: 70
End: 2025-03-19
Payer: MEDICARE

## 2025-03-19 ENCOUNTER — HOSPITAL ENCOUNTER (OUTPATIENT)
Age: 70
Setting detail: OUTPATIENT SURGERY
Discharge: HOME | End: 2025-03-19
Attending: INTERNAL MEDICINE | Admitting: INTERNAL MEDICINE
Payer: MEDICARE

## 2025-03-19 VITALS
TEMPERATURE: 98 F | SYSTOLIC BLOOD PRESSURE: 116 MMHG | HEART RATE: 75 BPM | WEIGHT: 142.5 LBS | DIASTOLIC BLOOD PRESSURE: 72 MMHG | BODY MASS INDEX: 20.4 KG/M2 | RESPIRATION RATE: 20 BRPM | HEIGHT: 70 IN | OXYGEN SATURATION: 98 %

## 2025-03-19 DIAGNOSIS — R13.19 ESOPHAGEAL DYSPHAGIA: ICD-10-CM

## 2025-03-19 PROCEDURE — 7100000010 HC PHASE II RECOVERY - FIRST 15 MIN: Performed by: INTERNAL MEDICINE

## 2025-03-19 PROCEDURE — 43239 EGD BIOPSY SINGLE/MULTIPLE: CPT | Performed by: INTERNAL MEDICINE

## 2025-03-19 PROCEDURE — 7100000011 HC PHASE II RECOVERY - ADDTL 15 MIN: Performed by: INTERNAL MEDICINE

## 2025-03-19 PROCEDURE — 6360000002 HC RX W HCPCS: Performed by: STUDENT IN AN ORGANIZED HEALTH CARE EDUCATION/TRAINING PROGRAM

## 2025-03-19 PROCEDURE — 3700000000 HC ANESTHESIA ATTENDED CARE: Performed by: INTERNAL MEDICINE

## 2025-03-19 PROCEDURE — 2580000003 HC RX 258: Performed by: STUDENT IN AN ORGANIZED HEALTH CARE EDUCATION/TRAINING PROGRAM

## 2025-03-19 PROCEDURE — 88305 TISSUE EXAM BY PATHOLOGIST: CPT

## 2025-03-19 PROCEDURE — 3609012400 HC EGD TRANSORAL BIOPSY SINGLE/MULTIPLE: Performed by: INTERNAL MEDICINE

## 2025-03-19 PROCEDURE — 88313 SPECIAL STAINS GROUP 2: CPT

## 2025-03-19 PROCEDURE — 2709999900 HC NON-CHARGEABLE SUPPLY: Performed by: INTERNAL MEDICINE

## 2025-03-19 PROCEDURE — 6360000002 HC RX W HCPCS: Performed by: NURSE ANESTHETIST, CERTIFIED REGISTERED

## 2025-03-19 RX ORDER — LIDOCAINE HYDROCHLORIDE 20 MG/ML
INJECTION, SOLUTION EPIDURAL; INFILTRATION; INTRACAUDAL; PERINEURAL
Status: DISCONTINUED | OUTPATIENT
Start: 2025-03-19 | End: 2025-03-19 | Stop reason: SDUPTHER

## 2025-03-19 RX ORDER — NALOXONE HYDROCHLORIDE 0.4 MG/ML
INJECTION, SOLUTION INTRAMUSCULAR; INTRAVENOUS; SUBCUTANEOUS PRN
Status: DISCONTINUED | OUTPATIENT
Start: 2025-03-19 | End: 2025-03-19 | Stop reason: HOSPADM

## 2025-03-19 RX ORDER — PROPOFOL 10 MG/ML
INJECTION, EMULSION INTRAVENOUS
Status: DISCONTINUED | OUTPATIENT
Start: 2025-03-19 | End: 2025-03-19 | Stop reason: SDUPTHER

## 2025-03-19 RX ORDER — LIDOCAINE HYDROCHLORIDE 10 MG/ML
1 INJECTION, SOLUTION INFILTRATION; PERINEURAL
Status: COMPLETED | OUTPATIENT
Start: 2025-03-19 | End: 2025-03-19

## 2025-03-19 RX ORDER — SODIUM CHLORIDE, SODIUM LACTATE, POTASSIUM CHLORIDE, CALCIUM CHLORIDE 600; 310; 30; 20 MG/100ML; MG/100ML; MG/100ML; MG/100ML
INJECTION, SOLUTION INTRAVENOUS CONTINUOUS
Status: DISCONTINUED | OUTPATIENT
Start: 2025-03-19 | End: 2025-03-19 | Stop reason: HOSPADM

## 2025-03-19 RX ADMIN — PROPOFOL 120 MCG/KG/MIN: 10 INJECTION, EMULSION INTRAVENOUS at 08:44

## 2025-03-19 RX ADMIN — LIDOCAINE HYDROCHLORIDE 25 MG: 20 INJECTION, SOLUTION EPIDURAL; INFILTRATION; INTRACAUDAL; PERINEURAL at 08:42

## 2025-03-19 RX ADMIN — LIDOCAINE HYDROCHLORIDE 1 ML: 10 INJECTION, SOLUTION INFILTRATION; PERINEURAL at 08:08

## 2025-03-19 RX ADMIN — SODIUM CHLORIDE, SODIUM LACTATE, POTASSIUM CHLORIDE, AND CALCIUM CHLORIDE: .6; .31; .03; .02 INJECTION, SOLUTION INTRAVENOUS at 08:08

## 2025-03-19 RX ADMIN — PROPOFOL 40 MG: 10 INJECTION, EMULSION INTRAVENOUS at 08:42

## 2025-03-19 ASSESSMENT — PAIN - FUNCTIONAL ASSESSMENT: PAIN_FUNCTIONAL_ASSESSMENT: 0-10

## 2025-03-19 NOTE — H&P
GASTROENTEROLOGY H&P    Easton Carbajal Sr. is 69 y.o. y/o male here for dysphagia.        Past Medical History:   Diagnosis Date    Arthritis     Asymmetrical hearing loss 9/19/2016    Asymmetrical hearing loss of left ear     Bolden esophagus     Basal cell carcinoma     CAD (coronary artery disease) 2001    CABG x5, no mi---    no problems since---- does not have a current cardio    Cancer of tongue (HCC) 4/14/15    base of tongue--- surg-- chemo --radiation--- all completed    Cancer of tonsil (HCC) 4/14/15    left tonsil and lymphnode    Cervical stenosis of spinal canal 1/28/2015    Chronic pharyngitis 9/19/2016    Coagulopathy 10/25/2011    Dysphagia     Eosinophilic esophagitis     Eustachian tube dysfunction     Head and neck cancer (HCC) 5/11/2015    Hearing loss in right ear 1996    r/t head injury    Heart disease     Hypothyroidism     Lymphedema     neck    Otalgia, unspecified ear     PONV (postoperative nausea and vomiting)     only had ponv x 1 -- no recent problems---per pt-- zofran works    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    COLONOSCOPY N/A 12/4/2024    COLONOSCOPY POLYPECTOMY SNARE performed by Raya Ayala MD at [facility] ENDOSCOPY    ESOPHAGOSCOPY N/A 12/4/2024    ESOPHAGOSCOPY DILATION SAVORY/ biopsies/ dilation 51 F savory (no heme noted post dilation) performed by Raya Ayala MD at [facility] ENDOSCOPY    GI  04/2015    Feeding tube --- has been removed    HEENT  4/2015    biopsy of tongue and tonsils; Squamous cell    CT UNLISTED PROCEDURE CARDIAC SURGERY  2001    CABG x 5    VASCULAR SURGERY  04/2015    right upper chest     Family History   Problem Relation Age of Onset    No Known Problems Sister     Cancer Maternal

## 2025-03-19 NOTE — DISCHARGE INSTRUCTIONS
Gastrointestinal Esophagogastroduodenoscopy (EGD)/ Endoscopic Ultrasound(EUS)- Upper Exam Discharge Instructions    1. Call Dr. Guzman for any problems or questions.  2. Contact the doctor's office for follow up appointment as directed.  3. Medication may cause drowsiness for several hours, therefore, do not drive or operate machinery for remainder of the day.  4. No alcohol today.  5. Do not make any important decisions such as signing legal paperwork.  6. Ordinarily, you may resume regular diet and activity after exam unless otherwise specified by your physician.  7. For mild soreness in your throat you may use Cepacol throat lozenges or warm  salt-water gargles as needed.    Any additional instructions:     Impression: Normal esophagus. Biopsies were taken with a cold forceps for evaluation of eosinophilic esophagitis. Normal stomach. Normal examined duodenum.     Recommendation: Return to GI clinic at appointment to be scheduled. If consistent with active EoE will plan to treat with Dupixent. Await pathology results. Patient has a contact number available for emergencies. The signs and symptoms of potential delayed complications were discussed with the patient. Return to normal activities tomorrow. Written discharge instructions were provided to the patient.

## 2025-03-19 NOTE — PROGRESS NOTES
Spiritual care and pre-op prayer given to patient.    Elif Martinez M.Div, [facility] / / Bereavement Coordinator  Spiritual Care Department   c: [phone]/ [phone] / [email or web address]     [address] [address]  [email or web address]

## 2025-03-19 NOTE — ANESTHESIA PRE PROCEDURE
Department of Anesthesiology  Preprocedure Note       Name:  Easton Carbajal Sr.   Age:  69 y.o.  :  1955                                          MRN:  [id number]         Date:  3/19/2025      Surgeon: Surgeon(s):  Marla Guzman MD    Procedure: Procedure(s):  ESOPHAGOGASTRODUODENOSCOPY WITH DILATION    Medications prior to admission:   Prior to Admission medications    Medication Sig Start Date End Date Taking? Authorizing Provider   omeprazole (PRILOSEC) 40 MG delayed release capsule Take 1 capsule by mouth 2 times daily (before meals) 3/4/25  Yes Marla Guzman MD   glycopyrrolate (ROBINUL) 1 MG tablet Take 1 tablet by mouth 3 times daily 10/29/24  Yes Easton Salas Jr., MD   levothyroxine (SYNTHROID) 75 MCG tablet Take 1 tablet by mouth every morning (before breakfast) 24  Yes Easton Salas Jr., MD       Current medications:    Current Facility-Administered Medications   Medication Dose Route Frequency Provider Last Rate Last Admin    lactated ringers infusion   IntraVENous Continuous Carlos Crespo  mL/hr at 25 0808 New Bag at 25 0808       Allergies:    Allergies   Allergen Reactions    Adhesive Tape Other (See Comments)     Tears skin  Paper tape okay    Codeine Nausea And Vomiting    Hydrocodone-Acetaminophen Nausea And Vomiting    Prednisone Nausea And Vomiting     Other reaction(s): Nausea / Vomiting, Unknown       Problem List:    Patient Active Problem List   Diagnosis Code    CAD (coronary artery disease) I25.10    Bilateral hand pain M79.641, M79.642    Numbness and tingling of both upper extremities R20.0, R20.2    Bilateral carpal tunnel syndrome G56.03    Degenerative arthritis of middle finger of left hand M19.042    Degenerative arthritis of middle finger of right hand M19.041    Dysphagia R13.10    Esophageal dysphagia R13.19       Past Medical History:        Diagnosis Date    Arthritis     Asymmetrical hearing loss 2016

## 2025-03-19 NOTE — ANESTHESIA POSTPROCEDURE EVALUATION
Department of Anesthesiology  Postprocedure Note    Patient: Easton Carbajal Sr.  MRN: [id number]  YOB: 1955  Date of evaluation: 3/19/2025    Procedure Summary       Date: 03/19/25 Room / Location: [facility] 01 / [facility] ENDOSCOPY    Anesthesia Start: 0840 Anesthesia Stop: 0854    Procedure: ESOPHAGOGASTRODUODENOSCOPY BIOPSY (Upper GI Region) Diagnosis:       Esophageal dysphagia      (Esophageal dysphagia [R13.19])    Surgeons: Marla Guzman MD Responsible Provider: Carlos Crespo MD    Anesthesia Type: TIVA ASA Status: 3            Anesthesia Type: No value filed.    Job Phase I:      Job Phase II: Job Score: 8    Anesthesia Post Evaluation    Patient location during evaluation: PACU  Patient participation: complete - patient participated  Level of consciousness: awake  Airway patency: patent  Nausea & Vomiting: no nausea and no vomiting  Cardiovascular status: blood pressure returned to baseline  Respiratory status: acceptable  Hydration status: euvolemic  Pain management: adequate    No notable events documented.

## 2025-03-25 ENCOUNTER — HOSPITAL ENCOUNTER (EMERGENCY)
Age: 70
Discharge: HOME | End: 2025-03-25
Attending: STUDENT IN AN ORGANIZED HEALTH CARE EDUCATION/TRAINING PROGRAM
Payer: MEDICARE

## 2025-03-25 ENCOUNTER — APPOINTMENT (OUTPATIENT)
Dept: GENERAL RADIOLOGY | Age: 70
End: 2025-03-25
Payer: MEDICARE

## 2025-03-25 ENCOUNTER — TELEPHONE (OUTPATIENT)
Dept: FAMILY MEDICINE CLINIC | Facility: CLINIC | Age: 70
End: 2025-03-25

## 2025-03-25 VITALS
WEIGHT: 142 LBS | HEIGHT: 70 IN | DIASTOLIC BLOOD PRESSURE: 90 MMHG | HEART RATE: 80 BPM | RESPIRATION RATE: 14 BRPM | BODY MASS INDEX: 20.33 KG/M2 | SYSTOLIC BLOOD PRESSURE: 110 MMHG | OXYGEN SATURATION: 96 % | TEMPERATURE: 98.4 F

## 2025-03-25 DIAGNOSIS — R53.83 OTHER FATIGUE: Primary | ICD-10-CM

## 2025-03-25 DIAGNOSIS — R53.1 GENERAL WEAKNESS: ICD-10-CM

## 2025-03-25 LAB
ALBUMIN SERPL-MCNC: 2.8 G/DL (ref 3.2–4.6)
ALBUMIN/GLOB SERPL: 0.7 (ref 1–1.9)
ALP SERPL-CCNC: 97 U/L (ref 40–129)
ALT SERPL-CCNC: 27 U/L (ref 8–55)
ANION GAP SERPL CALC-SCNC: 10 MMOL/L (ref 7–16)
APPEARANCE UR: CLEAR
AST SERPL-CCNC: 26 U/L (ref 15–37)
BASOPHILS # BLD: 0.04 K/UL (ref 0–0.2)
BASOPHILS NFR BLD: 0.4 % (ref 0–2)
BILIRUB SERPL-MCNC: 0.7 MG/DL (ref 0–1.2)
BILIRUB UR QL: NEGATIVE
BUN SERPL-MCNC: 17 MG/DL (ref 8–23)
CALCIUM SERPL-MCNC: 9.8 MG/DL (ref 8.8–10.2)
CHLORIDE SERPL-SCNC: 100 MMOL/L (ref 98–107)
CO2 SERPL-SCNC: 27 MMOL/L (ref 20–29)
COLOR UR: ABNORMAL
CREAT SERPL-MCNC: 1.22 MG/DL (ref 0.8–1.3)
DIFFERENTIAL METHOD BLD: ABNORMAL
EKG ATRIAL RATE: 87 BPM
EKG DIAGNOSIS: NORMAL
EKG P AXIS: 90 DEGREES
EKG P-R INTERVAL: 142 MS
EKG Q-T INTERVAL: 350 MS
EKG QRS DURATION: 96 MS
EKG QTC CALCULATION (BAZETT): 421 MS
EKG R AXIS: 86 DEGREES
EKG T AXIS: 65 DEGREES
EKG VENTRICULAR RATE: 87 BPM
EOSINOPHIL # BLD: 0.07 K/UL (ref 0–0.8)
EOSINOPHIL NFR BLD: 0.7 % (ref 0.5–7.8)
ERYTHROCYTE [DISTWIDTH] IN BLOOD BY AUTOMATED COUNT: 12 % (ref 11.9–14.6)
FLUAV RNA SPEC QL NAA+PROBE: NOT DETECTED
FLUBV RNA SPEC QL NAA+PROBE: NOT DETECTED
GLOBULIN SER CALC-MCNC: 4.3 G/DL (ref 2.3–3.5)
GLUCOSE SERPL-MCNC: 127 MG/DL (ref 70–99)
GLUCOSE UR STRIP.AUTO-MCNC: NEGATIVE MG/DL
HCT VFR BLD AUTO: 41.1 % (ref 41.1–50.3)
HGB BLD-MCNC: 13.7 G/DL (ref 13.6–17.2)
HGB UR QL STRIP: NEGATIVE
IMM GRANULOCYTES # BLD AUTO: 0.04 K/UL (ref 0–0.5)
IMM GRANULOCYTES NFR BLD AUTO: 0.4 % (ref 0–5)
KETONES UR QL STRIP.AUTO: 15 MG/DL
LEUKOCYTE ESTERASE UR QL STRIP.AUTO: NEGATIVE
LYMPHOCYTES # BLD: 0.59 K/UL (ref 0.5–4.6)
LYMPHOCYTES NFR BLD: 5.7 % (ref 13–44)
MAGNESIUM SERPL-MCNC: 2.1 MG/DL (ref 1.8–2.4)
MCH RBC QN AUTO: 32.1 PG (ref 26.1–32.9)
MCHC RBC AUTO-ENTMCNC: 33.3 G/DL (ref 31.4–35)
MCV RBC AUTO: 96.3 FL (ref 82–102)
MONOCYTES # BLD: 0.96 K/UL (ref 0.1–1.3)
MONOCYTES NFR BLD: 9.3 % (ref 4–12)
NEUTS SEG # BLD: 8.63 K/UL (ref 1.7–8.2)
NEUTS SEG NFR BLD: 83.5 % (ref 43–78)
NITRITE UR QL STRIP.AUTO: NEGATIVE
NRBC # BLD: 0 K/UL (ref 0–0.2)
PH UR STRIP: 5.5 (ref 5–9)
PLATELET # BLD AUTO: 264 K/UL (ref 150–450)
PMV BLD AUTO: 10 FL (ref 9.4–12.3)
POTASSIUM SERPL-SCNC: 5 MMOL/L (ref 3.5–5.1)
PROT SERPL-MCNC: 7.1 G/DL (ref 6.3–8.2)
PROT UR STRIP-MCNC: ABNORMAL MG/DL
RBC # BLD AUTO: 4.27 M/UL (ref 4.23–5.6)
SARS-COV-2 RDRP RESP QL NAA+PROBE: NOT DETECTED
SODIUM SERPL-SCNC: 137 MMOL/L (ref 136–145)
SOURCE: NORMAL
SP GR UR REFRACTOMETRY: 1.02 (ref 1–1.02)
UROBILINOGEN UR QL STRIP.AUTO: 0.2 EU/DL (ref 0.2–1)
WBC # BLD AUTO: 10.3 K/UL (ref 4.3–11.1)

## 2025-03-25 PROCEDURE — 80053 COMPREHEN METABOLIC PANEL: CPT

## 2025-03-25 PROCEDURE — 87636 SARSCOV2 & INF A&B AMP PRB: CPT

## 2025-03-25 PROCEDURE — 85025 COMPLETE CBC W/AUTO DIFF WBC: CPT

## 2025-03-25 PROCEDURE — 93010 ELECTROCARDIOGRAM REPORT: CPT | Performed by: INTERNAL MEDICINE

## 2025-03-25 PROCEDURE — 99285 EMERGENCY DEPT VISIT HI MDM: CPT

## 2025-03-25 PROCEDURE — 81001 URINALYSIS AUTO W/SCOPE: CPT

## 2025-03-25 PROCEDURE — 83735 ASSAY OF MAGNESIUM: CPT

## 2025-03-25 PROCEDURE — 93005 ELECTROCARDIOGRAM TRACING: CPT | Performed by: STUDENT IN AN ORGANIZED HEALTH CARE EDUCATION/TRAINING PROGRAM

## 2025-03-25 PROCEDURE — 2580000003 HC RX 258: Performed by: STUDENT IN AN ORGANIZED HEALTH CARE EDUCATION/TRAINING PROGRAM

## 2025-03-25 PROCEDURE — 71046 X-RAY EXAM CHEST 2 VIEWS: CPT

## 2025-03-25 RX ORDER — KETOROLAC TROMETHAMINE 10 MG/1
10 TABLET, FILM COATED ORAL EVERY 6 HOURS PRN
Qty: 20 TABLET | Refills: 0 | Status: SHIPPED | OUTPATIENT
Start: 2025-03-25

## 2025-03-25 RX ORDER — SODIUM CHLORIDE, SODIUM LACTATE, POTASSIUM CHLORIDE, AND CALCIUM CHLORIDE .6; .31; .03; .02 G/100ML; G/100ML; G/100ML; G/100ML
1000 INJECTION, SOLUTION INTRAVENOUS ONCE
Status: COMPLETED | OUTPATIENT
Start: 2025-03-25 | End: 2025-03-25

## 2025-03-25 RX ORDER — ONDANSETRON 4 MG/1
4 TABLET, ORALLY DISINTEGRATING ORAL EVERY 12 HOURS PRN
Qty: 20 TABLET | Refills: 0 | Status: SHIPPED | OUTPATIENT
Start: 2025-03-25 | End: 2025-04-04

## 2025-03-25 RX ADMIN — SODIUM CHLORIDE, SODIUM LACTATE, POTASSIUM CHLORIDE, AND CALCIUM CHLORIDE 1000 ML: .6; .31; .03; .02 INJECTION, SOLUTION INTRAVENOUS at 12:48

## 2025-03-25 ASSESSMENT — PAIN - FUNCTIONAL ASSESSMENT: PAIN_FUNCTIONAL_ASSESSMENT: NONE - DENIES PAIN

## 2025-03-25 ASSESSMENT — ENCOUNTER SYMPTOMS
ABDOMINAL PAIN: 0
NAUSEA: 0
SORE THROAT: 0
VOMITING: 0
SHORTNESS OF BREATH: 0
EYE REDNESS: 0
EYE PAIN: 0
COUGH: 0

## 2025-03-25 ASSESSMENT — LIFESTYLE VARIABLES
HOW OFTEN DO YOU HAVE A DRINK CONTAINING ALCOHOL: NEVER
HOW MANY STANDARD DRINKS CONTAINING ALCOHOL DO YOU HAVE ON A TYPICAL DAY: PATIENT DOES NOT DRINK

## 2025-03-25 NOTE — TELEPHONE ENCOUNTER
Pt called and said he wants Dr to call him. He said he can't sleep and his head feels numb. He said he doesn't want to go to the ER without talking to someone first. No appts this week with Maritza.

## 2025-03-25 NOTE — ED TRIAGE NOTES
Patient states had EGD with biopsies last wee, States since the procedure he has fatigue, decrease appetite and just wants to sleep. States GI and PMD told him to come to ER for evaluation.

## 2025-03-25 NOTE — ED PROVIDER NOTES
Emergency Department Provider Note       PCP: Easton Salas Jr., MD   Age: 69 y.o.   Sex: male     DISPOSITION Decision To Discharge 03/25/2025 03:20:18 PM    ICD-10-CM    1. Other fatigue  R53.83       2. General weakness  R53.1           Medical Decision Making     69-year-old male presents to the ED for generalized fatigue.  Vital signs within normal limits.  Physical exam unremarkable.  No abdominal tenderness noted on exam.  No focal weakness or neurologic deficits.  EKG shows NSR, no ST elevation or lethal dysrhythmia.  Not complaining of any chest pain at this time.  COVID/influenza both negative.  No significant electrolyte derangement.  Patient given IV fluid and antiemetics with significant improvement of symptoms.  Resting comfortably and is now requesting discharge.  UA does not demonstrate urinary tract infection.  Will send home with anti-inflammatories and nausea medication.  Has follow-up with GI at the end of this week to discuss results of EGD biopsy.  Hemodynamically stable and symptom-free at time of discharge.    Patient was instructed to follow-up with PCP in the next 24 to 48 hours and to follow-up with all specialists given with discharge as soon as possible.  Patient is nontoxic-appearing and has no complaints at time of discharge.  Instructed to return to the emergency department immediately if current symptoms worsen, or any new/concerning symptoms develop for which they voice understanding.  All questions answered at time of discharge.       1 or more acute illnesses that pose a threat to life or bodily function.   Prescription drug management performed.  Patient was discharged risks and benefits of hospitalization were considered.  Shared medical decision making was utilized in creating the patients health plan today.  I independently ordered and reviewed each unique test.    I reviewed external records: ED visit note from a different ED.   I reviewed external records: provider

## 2025-03-28 ENCOUNTER — TELEMEDICINE (OUTPATIENT)
Age: 70
End: 2025-03-28
Payer: MEDICARE

## 2025-03-28 DIAGNOSIS — R13.12 OROPHARYNGEAL DYSPHAGIA: Primary | ICD-10-CM

## 2025-03-28 PROCEDURE — G8420 CALC BMI NORM PARAMETERS: HCPCS | Performed by: INTERNAL MEDICINE

## 2025-03-28 PROCEDURE — 1036F TOBACCO NON-USER: CPT | Performed by: INTERNAL MEDICINE

## 2025-03-28 PROCEDURE — 3017F COLORECTAL CA SCREEN DOC REV: CPT | Performed by: INTERNAL MEDICINE

## 2025-03-28 PROCEDURE — G8427 DOCREV CUR MEDS BY ELIG CLIN: HCPCS | Performed by: INTERNAL MEDICINE

## 2025-03-28 PROCEDURE — 99214 OFFICE O/P EST MOD 30 MIN: CPT | Performed by: INTERNAL MEDICINE

## 2025-03-28 PROCEDURE — 1159F MED LIST DOCD IN RCRD: CPT | Performed by: INTERNAL MEDICINE

## 2025-03-28 PROCEDURE — 1123F ACP DISCUSS/DSCN MKR DOCD: CPT | Performed by: INTERNAL MEDICINE

## 2025-03-28 PROCEDURE — 1160F RVW MEDS BY RX/DR IN RCRD: CPT | Performed by: INTERNAL MEDICINE

## 2025-03-28 RX ORDER — HYOSCYAMINE SULFATE 0.12 MG/1
0.12 TABLET SUBLINGUAL EVERY 4 HOURS PRN
Qty: 90 TABLET | Refills: 3 | Status: SHIPPED | OUTPATIENT
Start: 2025-03-28

## 2025-03-28 NOTE — PROGRESS NOTES
Easton Carbajal Sr., was evaluated through a synchronous (real-time) audio-video encounter. The patient (or guardian if applicable) is aware that this is a billable service, which includes applicable co-pays. This Virtual Visit was conducted with patient's (and/or legal guardian's) consent. Patient identification was verified, and a caregiver was present when appropriate.   The patient was located at Home: [address] [place] [id number]  Provider was located at Home (Appt Dept State): [place]  Confirm you are appropriately licensed, registered, or certified to deliver care in the state where the patient is located as indicated above. If you are not or unsure, please re-schedule the visit: Yes, I confirm.     Easton Carbajal Sr. (:  1955) is a Established patient, presenting virtually for evaluation of the following:    Patient presents for follow up today of dysphagia. Recently underwent EGD with repeat esophogeal biopsies to evaluate for EoE response to treatment. Pathology reviewed with patient. Eosinophils less than 4/hpf in distal esophagus, none in mid esophagus. He has felt a slight improvement in symptoms. He continues to blend/puree his food which helps. Most of his trouble is with swallowing. Once he has swallowed, the bolus will pass without issue. Suspect large component of oropharyngeal dysphagia due to prior radiation therapy.       Below is the assessment and plan developed based on review of pertinent history, physical exam, labs, studies, and medications.     Assessment and plan:  Dysphagia. EoE treated with PPI. Continues to have symptoms more consistent with oropharyngeal dysphagia. Esophageal manometry showed ineffective esophageal dysmotility     - With oropharyngeal dysphagia and prior radiation therapy, there is a limitation to treatment options   - Recommend that he continue to modify his diet and eating habits. Making sure that he chews well or purees his

## 2025-04-01 ENCOUNTER — HOSPITAL ENCOUNTER (EMERGENCY)
Age: 70
Discharge: HOME | End: 2025-04-01
Attending: EMERGENCY MEDICINE
Payer: MEDICARE

## 2025-04-01 ENCOUNTER — TELEPHONE (OUTPATIENT)
Dept: FAMILY MEDICINE CLINIC | Facility: CLINIC | Age: 70
End: 2025-04-01

## 2025-04-01 ENCOUNTER — APPOINTMENT (OUTPATIENT)
Dept: CT IMAGING | Age: 70
End: 2025-04-01
Payer: MEDICARE

## 2025-04-01 VITALS
OXYGEN SATURATION: 94 % | DIASTOLIC BLOOD PRESSURE: 75 MMHG | TEMPERATURE: 99.5 F | WEIGHT: 143 LBS | BODY MASS INDEX: 20.47 KG/M2 | RESPIRATION RATE: 16 BRPM | SYSTOLIC BLOOD PRESSURE: 102 MMHG | HEART RATE: 81 BPM | HEIGHT: 70 IN

## 2025-04-01 DIAGNOSIS — R13.14 PHARYNGOESOPHAGEAL DYSPHAGIA: Primary | ICD-10-CM

## 2025-04-01 LAB
ANION GAP SERPL CALC-SCNC: 12 MMOL/L (ref 7–16)
BASOPHILS # BLD: 0.03 K/UL (ref 0–0.2)
BASOPHILS NFR BLD: 0.3 % (ref 0–2)
BUN SERPL-MCNC: 17 MG/DL (ref 8–23)
CALCIUM SERPL-MCNC: 9.4 MG/DL (ref 8.8–10.2)
CHLORIDE SERPL-SCNC: 95 MMOL/L (ref 98–107)
CO2 SERPL-SCNC: 28 MMOL/L (ref 20–29)
CREAT SERPL-MCNC: 0.98 MG/DL (ref 0.8–1.3)
CRP SERPL HS-MCNC: 238 MG/L (ref 0–3)
DIFFERENTIAL METHOD BLD: ABNORMAL
EOSINOPHIL # BLD: 0.03 K/UL (ref 0–0.8)
EOSINOPHIL NFR BLD: 0.3 % (ref 0.5–7.8)
ERYTHROCYTE [DISTWIDTH] IN BLOOD BY AUTOMATED COUNT: 12.1 % (ref 11.9–14.6)
GLUCOSE SERPL-MCNC: 107 MG/DL (ref 70–99)
HCT VFR BLD AUTO: 38.6 % (ref 41.1–50.3)
HGB BLD-MCNC: 12.6 G/DL (ref 13.6–17.2)
IMM GRANULOCYTES # BLD AUTO: 0.05 K/UL (ref 0–0.5)
IMM GRANULOCYTES NFR BLD AUTO: 0.5 % (ref 0–5)
LACTATE SERPL-SCNC: 1.1 MMOL/L (ref 0.5–2)
LYMPHOCYTES # BLD: 0.57 K/UL (ref 0.5–4.6)
LYMPHOCYTES NFR BLD: 5.3 % (ref 13–44)
MAGNESIUM SERPL-MCNC: 2.1 MG/DL (ref 1.8–2.4)
MCH RBC QN AUTO: 31.3 PG (ref 26.1–32.9)
MCHC RBC AUTO-ENTMCNC: 32.6 G/DL (ref 31.4–35)
MCV RBC AUTO: 95.8 FL (ref 82–102)
MONOCYTES # BLD: 0.82 K/UL (ref 0.1–1.3)
MONOCYTES NFR BLD: 7.6 % (ref 4–12)
NEUTS SEG # BLD: 9.22 K/UL (ref 1.7–8.2)
NEUTS SEG NFR BLD: 86 % (ref 43–78)
NRBC # BLD: 0 K/UL (ref 0–0.2)
PLATELET # BLD AUTO: 347 K/UL (ref 150–450)
PMV BLD AUTO: 10.2 FL (ref 9.4–12.3)
POTASSIUM SERPL-SCNC: 4.1 MMOL/L (ref 3.5–5.1)
RBC # BLD AUTO: 4.03 M/UL (ref 4.23–5.6)
SODIUM SERPL-SCNC: 135 MMOL/L (ref 136–145)
WBC # BLD AUTO: 10.7 K/UL (ref 4.3–11.1)

## 2025-04-01 PROCEDURE — 6370000000 HC RX 637 (ALT 250 FOR IP): Performed by: EMERGENCY MEDICINE

## 2025-04-01 PROCEDURE — 99285 EMERGENCY DEPT VISIT HI MDM: CPT

## 2025-04-01 PROCEDURE — 70491 CT SOFT TISSUE NECK W/DYE: CPT

## 2025-04-01 PROCEDURE — 85025 COMPLETE CBC W/AUTO DIFF WBC: CPT

## 2025-04-01 PROCEDURE — 80048 BASIC METABOLIC PNL TOTAL CA: CPT

## 2025-04-01 PROCEDURE — 2580000003 HC RX 258: Performed by: EMERGENCY MEDICINE

## 2025-04-01 PROCEDURE — 86141 C-REACTIVE PROTEIN HS: CPT

## 2025-04-01 PROCEDURE — 83605 ASSAY OF LACTIC ACID: CPT

## 2025-04-01 PROCEDURE — 96361 HYDRATE IV INFUSION ADD-ON: CPT

## 2025-04-01 PROCEDURE — 6360000004 HC RX CONTRAST MEDICATION: Performed by: EMERGENCY MEDICINE

## 2025-04-01 PROCEDURE — 6360000002 HC RX W HCPCS: Performed by: EMERGENCY MEDICINE

## 2025-04-01 PROCEDURE — 96374 THER/PROPH/DIAG INJ IV PUSH: CPT

## 2025-04-01 PROCEDURE — 83735 ASSAY OF MAGNESIUM: CPT

## 2025-04-01 RX ORDER — IOPAMIDOL 755 MG/ML
80 INJECTION, SOLUTION INTRAVASCULAR
Status: COMPLETED | OUTPATIENT
Start: 2025-04-01 | End: 2025-04-01

## 2025-04-01 RX ORDER — 0.9 % SODIUM CHLORIDE 0.9 %
1000 INTRAVENOUS SOLUTION INTRAVENOUS ONCE
Status: COMPLETED | OUTPATIENT
Start: 2025-04-01 | End: 2025-04-01

## 2025-04-01 RX ORDER — MAGNESIUM HYDROXIDE/ALUMINUM HYDROXICE/SIMETHICONE 120; 1200; 1200 MG/30ML; MG/30ML; MG/30ML
30 SUSPENSION ORAL
Status: COMPLETED | OUTPATIENT
Start: 2025-04-01 | End: 2025-04-01

## 2025-04-01 RX ORDER — ONDANSETRON 2 MG/ML
4 INJECTION INTRAMUSCULAR; INTRAVENOUS
Status: COMPLETED | OUTPATIENT
Start: 2025-04-01 | End: 2025-04-01

## 2025-04-01 RX ORDER — LIDOCAINE HYDROCHLORIDE 20 MG/ML
15 SOLUTION OROPHARYNGEAL
Status: COMPLETED | OUTPATIENT
Start: 2025-04-01 | End: 2025-04-01

## 2025-04-01 RX ADMIN — IOPAMIDOL 80 ML: 755 INJECTION, SOLUTION INTRAVENOUS at 15:22

## 2025-04-01 RX ADMIN — LIDOCAINE HYDROCHLORIDE 15 ML: 20 SOLUTION ORAL at 14:32

## 2025-04-01 RX ADMIN — ALUMINUM HYDROXIDE, MAGNESIUM HYDROXIDE, DIMETHICONE 30 ML: 400; 400; 40 SUSPENSION ORAL at 14:32

## 2025-04-01 RX ADMIN — SODIUM CHLORIDE 1000 ML: 0.9 INJECTION, SOLUTION INTRAVENOUS at 13:38

## 2025-04-01 RX ADMIN — ONDANSETRON 4 MG: 2 INJECTION, SOLUTION INTRAMUSCULAR; INTRAVENOUS at 14:35

## 2025-04-01 ASSESSMENT — PAIN - FUNCTIONAL ASSESSMENT
PAIN_FUNCTIONAL_ASSESSMENT: NONE - DENIES PAIN
PAIN_FUNCTIONAL_ASSESSMENT: 0-10

## 2025-04-01 ASSESSMENT — PAIN SCALES - GENERAL: PAINLEVEL_OUTOF10: 0

## 2025-04-01 NOTE — ED NOTES
Pt having trouble swallowing. States it has caused him to lose weight quickly. Meds were given to him in a 30 ml syringe. Pt tolerated this method of delivery well. He was able to take in 5 ml at a time. Some coughing, but he says that is common if he tries to go too fast.   Declines

## 2025-04-01 NOTE — TELEPHONE ENCOUNTER
Patient spent all day at ER last Tuesday and was sent home. He feels that he needs to be in hospital but wants the doctor to look at his chart and call him. He states he can't sit in the ER all day again. He really needs some advise. Please call him at [phone] ASAP.

## 2025-04-01 NOTE — TELEPHONE ENCOUNTER
Patient called again and said to scratch that last message because he hasn't heard back. I told pt  is with a patient. Patient was very upset then hung up.

## 2025-04-02 ENCOUNTER — CARE COORDINATION (OUTPATIENT)
Dept: CARE COORDINATION | Facility: CLINIC | Age: 70
End: 2025-04-02

## 2025-04-02 NOTE — CARE COORDINATION
Ambulatory Care Coordination Note     4/2/2025 7:44 AM     ACM outreach attempt by this ACM today to offer care management services. ACM was unable to reach the patient by telephone today;   hospital follow up call within 2 business days of discharge: Yes     ACM: Tiara Richter, RN     Care Summary Note: left message    PCP/Specialist follow up:   Future Appointments         Provider Specialty Dept Phone    4/4/2025 11:00 AM Easton Salas Jr., MD Family [facility] [phone]    4/23/2025 11:00 AM (Arrive by 10:45 AM) Giovanna Dunlap, APRN - CNP Orthopedic Surgery [phone]    9/26/2025 8:30 AM Marla Guzman MD Gastroenterology [phone]    10/27/2025 10:20 AM F LAB RESOURCE Family [facility] [phone]    11/3/2025 10:00 AM Easton Salas Jr., MD Family Medicine [phone]            Follow Up:   Plan for next ACM outreach in approximately 1-2 days  to complete:  - outreach attempt to offer care management services.

## 2025-04-02 NOTE — CARE COORDINATION
Ambulatory Care Coordination Note     2025 8:46 AM     Patient Current Location:  [place]     This patient was received as a referral from Ambulatory Care Manager .    ACM contacted the patient by telephone. Verified name and  with patient as identifiers. Provided introduction to self, and explanation of the ACM role.   Patient accepted care management services at this time.          ACM: Tiara Richter RN     Challenges to be reviewed by the provider   Additional needs identified to be addressed with provider Yes  Left message with pcp to notify of er admission and need for follow up               Method of communication with provider: staff message.    Utilization: Initial Call - Discharge Date: 25   Discharge Facility: [facility]  Reason for ED Visit: fatigue  Visit Diagnosis: weakness    Number of ED visits in the last 6 months: 1      Do you have any ongoing symptoms? No  Did you call your PCP prior to going to the ED? No, did not call the PCP office.     Review of Discharge Instructions:   [x] AVS discharge instructions  [] Right Care, Right Place, Right Time document  [x] Medication changes  [x] Follow up appointments  [] Referral follow up   []        Care Summary Note: see assessment. Ccm outreached to patient. Patient agreeable with ccm services. Ccm reviewed with patient discharge summary. Patient verbalized understanding. Pt states wife is around on the weekends to help with needs. Patient states no falls. Ccm left message with pcp office to notify of er admission and need for follow up. They will call patient to make appointment. Patient states no questions or concerns. Ccm discussed that I would outreach next week. Patient agreeable.     Offered patient enrollment in the Remote Patient Monitoring (RPM) program for in-home monitoring: Patient is not eligible for RPM program because: patient does not have qualifying diagnosis.     Assessments Completed:       2025

## 2025-04-03 NOTE — ED PROVIDER NOTES
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    COLONOSCOPY N/A 12/4/2024    COLONOSCOPY POLYPECTOMY SNARE performed by Raya Ayala MD at [facility] ENDOSCOPY    ESOPHAGOSCOPY N/A 12/4/2024    ESOPHAGOSCOPY DILATION SAVORY/ biopsies/ dilation 51 F savory (no heme noted post dilation) performed by Raya Ayala MD at [facility] ENDOSCOPY    GI  04/2015    Feeding tube --- has been removed    HEENT  4/2015    biopsy of tongue and tonsils; Squamous cell    MD UNLISTED PROCEDURE CARDIAC SURGERY  2001    CABG x 5    UPPER GASTROINTESTINAL ENDOSCOPY N/A 3/19/2025    ESOPHAGOGASTRODUODENOSCOPY BIOPSY performed by Marla Guzman MD at [facility] ENDOSCOPY    VASCULAR SURGERY  04/2015    right upper chest        Social History     Socioeconomic History    Marital status:    Tobacco Use    Smoking status: Never    Smokeless tobacco: Never   Vaping Use    Vaping status: Never Used   Substance and Sexual Activity    Alcohol use: No    Drug use: Never     Social Drivers of Health     Financial Resource Strain: Low Risk  (10/26/2023)    Overall Financial Resource Strain (CARDIA)     Difficulty of Paying Living Expenses: Not hard at all   Food Insecurity: No Food Insecurity (4/4/2025)

## 2025-04-04 ENCOUNTER — OFFICE VISIT (OUTPATIENT)
Dept: FAMILY MEDICINE CLINIC | Facility: CLINIC | Age: 70
End: 2025-04-04

## 2025-04-04 VITALS
OXYGEN SATURATION: 94 % | DIASTOLIC BLOOD PRESSURE: 72 MMHG | TEMPERATURE: 97.1 F | WEIGHT: 133 LBS | HEART RATE: 99 BPM | BODY MASS INDEX: 19.08 KG/M2 | SYSTOLIC BLOOD PRESSURE: 120 MMHG

## 2025-04-04 DIAGNOSIS — R13.12 OROPHARYNGEAL DYSPHAGIA: ICD-10-CM

## 2025-04-04 DIAGNOSIS — Z85.810 HISTORY OF TONGUE CANCER: ICD-10-CM

## 2025-04-04 DIAGNOSIS — R63.4 WEIGHT LOSS: ICD-10-CM

## 2025-04-04 DIAGNOSIS — R53.83 FATIGUE, UNSPECIFIED TYPE: Primary | ICD-10-CM

## 2025-04-04 DIAGNOSIS — E03.9 ACQUIRED HYPOTHYROIDISM: ICD-10-CM

## 2025-04-04 LAB — TSH W FREE THYROID IF ABNORMAL: 3.09 UIU/ML (ref 0.27–4.2)

## 2025-04-04 RX ORDER — HYOSCYAMINE SULFATE 0.12 MG/1
0.12 TABLET SUBLINGUAL EVERY 4 HOURS PRN
Qty: 540 TABLET | Refills: 1 | Status: SHIPPED | OUTPATIENT
Start: 2025-04-04

## 2025-04-04 RX ORDER — METOCLOPRAMIDE 5 MG/1
5 TABLET ORAL 3 TIMES DAILY
Qty: 120 TABLET | Refills: 3 | Status: SHIPPED | OUTPATIENT
Start: 2025-04-04

## 2025-04-04 SDOH — ECONOMIC STABILITY: FOOD INSECURITY: WITHIN THE PAST 12 MONTHS, THE FOOD YOU BOUGHT JUST DIDN'T LAST AND YOU DIDN'T HAVE MONEY TO GET MORE.: NEVER TRUE

## 2025-04-04 SDOH — ECONOMIC STABILITY: FOOD INSECURITY: WITHIN THE PAST 12 MONTHS, YOU WORRIED THAT YOUR FOOD WOULD RUN OUT BEFORE YOU GOT MONEY TO BUY MORE.: NEVER TRUE

## 2025-04-04 ASSESSMENT — PATIENT HEALTH QUESTIONNAIRE - PHQ9
SUM OF ALL RESPONSES TO PHQ QUESTIONS 1-9: 0
2. FEELING DOWN, DEPRESSED OR HOPELESS: NOT AT ALL
1. LITTLE INTEREST OR PLEASURE IN DOING THINGS: NOT AT ALL
SUM OF ALL RESPONSES TO PHQ QUESTIONS 1-9: 0

## 2025-04-04 ASSESSMENT — ENCOUNTER SYMPTOMS
RESPIRATORY NEGATIVE: 1
SORE THROAT: 0
EYES NEGATIVE: 1
GASTROINTESTINAL NEGATIVE: 1
TROUBLE SWALLOWING: 1

## 2025-04-04 NOTE — PROGRESS NOTES
HISTORY OF PRESENT ILLNESS    Easton Carbajal Sr. is a 69 y.o. male.  HPI  Chief Complaint   Patient presents with    Follow-Up from Hospital     04/01/2025- Pharyngoesophageal dysphagia     See above. History of tongue cancer in 2015 treated with resection, chemo and radiation. No apparent recurrence. Since then has had progressive difficulty swallowing. Followed by GI. During the last year has had EGDx3. During one procedure the esophagus was dilated. States biopsies were all negative for cancer. Difficulty swallowing has been severe. Literally only able to intake a few tablespoons of pureed food. States food feels like it gets stuck in back of throat and will not go down. Has frequent regurgitation. No abdominal pain. No change in BM. GI prescribed Levsin which caused severe sleepiness. Omeprazole did not help. Continues to lose weight. Feels tired all the time.  Went to ER 3 days ago. Labs indicated malnutrition but otherwise stable.  Wife reports that multiple EGDs showed no obstruction.      Current Outpatient Medications on File Prior to Visit   Medication Sig Dispense Refill    GI COCKTAIL Take 10-15 mLs by mouth 3 times daily as needed (chest pain) 180ml maalox or mylanta 60ml benadryl 60ml viscous lidocaine 300 mL 1    ondansetron (ZOFRAN-ODT) 4 MG disintegrating tablet Take 1 tablet by mouth every 12 hours as needed for Nausea or Vomiting 20 tablet 0    ketorolac (TORADOL) 10 MG tablet Take 1 tablet by mouth every 6 hours as needed for Pain 20 tablet 0    omeprazole (PRILOSEC) 40 MG delayed release capsule Take 1 capsule by mouth 2 times daily (before meals) 180 capsule 1    glycopyrrolate (ROBINUL) 1 MG tablet Take 1 tablet by mouth 3 times daily 90 tablet 3    levothyroxine (SYNTHROID) 75 MCG tablet Take 1 tablet by mouth every morning (before breakfast) 90 tablet 3     No current facility-administered medications on file prior to visit.     Past Medical History:   Diagnosis Date    Arthritis

## 2025-04-05 ASSESSMENT — ENCOUNTER SYMPTOMS
CHOKING: 0
ABDOMINAL PAIN: 0
COUGH: 0
SHORTNESS OF BREATH: 0
NAUSEA: 0
TROUBLE SWALLOWING: 1
EYE REDNESS: 0
DIARRHEA: 0
WHEEZING: 0
VOICE CHANGE: 1
FACIAL SWELLING: 0
BACK PAIN: 0
EYE DISCHARGE: 0
RHINORRHEA: 0
VOMITING: 0
STRIDOR: 0
COLOR CHANGE: 0

## 2025-04-07 ENCOUNTER — TELEPHONE (OUTPATIENT)
Dept: FAMILY MEDICINE CLINIC | Facility: CLINIC | Age: 70
End: 2025-04-07

## 2025-04-07 ENCOUNTER — TELEPHONE (OUTPATIENT)
Dept: GASTROENTEROLOGY | Age: 70
End: 2025-04-07

## 2025-04-07 ENCOUNTER — CARE COORDINATION (OUTPATIENT)
Dept: CARE COORDINATION | Facility: CLINIC | Age: 70
End: 2025-04-07

## 2025-04-07 ENCOUNTER — TELEPHONE (OUTPATIENT)
Age: 70
End: 2025-04-07

## 2025-04-07 DIAGNOSIS — Z46.59 ENCOUNTER FOR FEEDING TUBE PLACEMENT: Primary | ICD-10-CM

## 2025-04-07 DIAGNOSIS — E86.0 DEHYDRATION: ICD-10-CM

## 2025-04-07 DIAGNOSIS — R13.10 DYSPHAGIA, UNSPECIFIED TYPE: ICD-10-CM

## 2025-04-07 NOTE — TELEPHONE ENCOUNTER
Per supervisor Dr. Guzman wants the patient scheduled for a EGD with PEG placement on Thursday 4/10/2025 at [facility]. Called and spoke with the patient and his wife and confirmed that the procedure has been scheduled and reviewed instructions:    The day before your test->     You should NOT eat or drink after midnight.

## 2025-04-07 NOTE — TELEPHONE ENCOUNTER
Pt's wife called and said pt needs to get set up with 10 iv infusions at the infusion center. She said pt was just here Friday.

## 2025-04-07 NOTE — TELEPHONE ENCOUNTER
Patient and wife called stating the patient should have an appointment with Dr. Guzman today as a virtual visit. Informed patient that there is no appointment scheduled today and next scheduled appointment with Dr. Guzman is on 9/26 for a six month follow up. Patient's wife then stated that patient needs to be seen urgently for a feeding tube as it was recommended by the ER. Patient's wife also requesting infusions a few times a week for IV fluids to ensure patient stays hydrated. Informed wife I would get a message over to Dr. Guzman but she will not be available until later in the week; wife then insisted on seeing Dr. Ayala since Thomas would not be available as soon as requesting. Explained to patient and wife that the providers prefer sticking to an individual provider and wife requested to speak with manager. Contacted supervisor, Olga and she is going to speak with provider and nurse to see what Dr. Guzman recommends.

## 2025-04-07 NOTE — TELEPHONE ENCOUNTER
Spoke with supervisor Olga Barnhart who states that she discussed pt's concerns/case with Dr. Guzman directly. Olga states that per MD, pt was scheduled for Virtual Visit this Wednesday 4/9/25 at 12:00pm, and scheduled for EGD with PEG tube placement this Thursday 4/10/25.     Olga informed RN that pt's wife was requesting for Dr. Guzman to arrange an appointment for outpatient IV hydration tomorrow. Per Olga, RN should reach out to Dr. Guzman and address question about IV hydration.     RN contacted Dr. Guzman to discuss question about outpatient IV hydration as instructed by supervisor. Per Dr. Guzman, not appropriate for outpatient IV hydration orders to be placed at this time. If pt gets to the point of needing IV hydration, he needs to go back to ER for hydration with more extensive monitoring. Pt should continue attempting as much liquid hydration as he can tolerate via small sips of water, electrolyte replacement, ensure, etc.     Called pt to relay response from Dr. Guzman about IV hydration. Spoke with pt and pt's wife over the phone, relayed above response from Dr. Guzman. Pt and wife verbalized understanding, agreeable to provider recommendation.    -------------------------------------------------------    Per Dr. Guzman, message sent to AMANDA Sanchez to facilitate process for feeding orders/education for pt.

## 2025-04-08 NOTE — TELEPHONE ENCOUNTER
Pt's wife states he is having a G tube placed on Thursday but she wants him to have an IV hydration infusion either today or tomorrow since he is not eating. She states she asked the ER and GI and they both said no.

## 2025-04-09 ENCOUNTER — TELEMEDICINE (OUTPATIENT)
Age: 70
End: 2025-04-09

## 2025-04-09 DIAGNOSIS — K22.4 ESOPHAGEAL DYSMOTILITY: ICD-10-CM

## 2025-04-09 DIAGNOSIS — R13.19 ESOPHAGEAL DYSPHAGIA: ICD-10-CM

## 2025-04-09 DIAGNOSIS — K20.0 EOSINOPHILIC ESOPHAGITIS: ICD-10-CM

## 2025-04-09 RX ORDER — OMEPRAZOLE 40 MG/1
40 CAPSULE, DELAYED RELEASE ORAL DAILY
Qty: 180 CAPSULE | Refills: 1 | Status: SHIPPED | OUTPATIENT
Start: 2025-04-09

## 2025-04-09 NOTE — PROGRESS NOTES
spent 30 minutes reviewing previous notes, test results and face to face (virtual) with the patient discussing the diagnosis and importance of compliance with the treatment plan as well as documenting on the day of the visit.    --Marla Guzman MD

## 2025-04-09 NOTE — PROGRESS NOTES
RN called Pt to confirm appointment time of 0800, arrival time 0630, location,  requirement, and instructions for registration at the hospital.  Pt verbalized understanding.

## 2025-04-09 NOTE — H&P
GASTROENTEROLOGY H&P    Easton Carbajal Sr. is 69 y.o. y/o male here for history of tongue cancer and dysphagia.        Past Medical History:   Diagnosis Date    Arthritis     Asymmetrical hearing loss 9/19/2016    Asymmetrical hearing loss of left ear     Bolden esophagus     Basal cell carcinoma     CAD (coronary artery disease) 2001    CABG x5, no mi---    no problems since---- does not have a current cardio    Cancer of tongue (HCC) 4/14/15    base of tongue--- surg-- chemo --radiation--- all completed    Cancer of tonsil (HCC) 4/14/15    left tonsil and lymphnode    Cervical stenosis of spinal canal 1/28/2015    Chronic pharyngitis 9/19/2016    Coagulopathy 10/25/2011    Dysphagia     Eosinophilic esophagitis     Eustachian tube dysfunction     Head and neck cancer (HCC) 5/11/2015    Hearing loss in right ear 1996    r/t head injury    Heart disease     Hypothyroidism     Lymphedema     neck    Otalgia, unspecified ear     PONV (postoperative nausea and vomiting)     only had ponv x 1 -- no recent problems---per pt-- zofran works    Squamous cell carcinoma of 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    COLONOSCOPY N/A 12/4/2024    COLONOSCOPY POLYPECTOMY SNARE performed by Raya Ayala MD at [facility] ENDOSCOPY    ESOPHAGOSCOPY N/A 12/4/2024    ESOPHAGOSCOPY DILATION SAVORY/ biopsies/ dilation 51 F savory (no heme noted post dilation) performed by Raya Ayala MD at [facility] ENDOSCOPY    GI  04/2015    Feeding tube --- has been removed    HEENT  4/2015    biopsy of tongue and tonsils; Squamous cell    [place] UNLISTED PROCEDURE CARDIAC SURGERY  2001    CABG x 5    UPPER GASTROINTESTINAL ENDOSCOPY N/A 3/19/2025    ESOPHAGOGASTRODUODENOSCOPY BIOPSY performed by Marla Guzman MD at [facility] [place] [place]

## 2025-04-10 ENCOUNTER — OFFICE VISIT (OUTPATIENT)
Dept: ONCOLOGY | Age: 70
End: 2025-04-10

## 2025-04-10 ENCOUNTER — HOSPITAL ENCOUNTER (OUTPATIENT)
Age: 70
Discharge: HOME | End: 2025-04-10
Attending: INTERNAL MEDICINE | Admitting: INTERNAL MEDICINE
Payer: MEDICARE

## 2025-04-10 ENCOUNTER — ANESTHESIA EVENT (OUTPATIENT)
Dept: SURGERY | Age: 70
End: 2025-04-10
Payer: MEDICARE

## 2025-04-10 ENCOUNTER — ANESTHESIA (OUTPATIENT)
Dept: SURGERY | Age: 70
End: 2025-04-10
Payer: MEDICARE

## 2025-04-10 VITALS
TEMPERATURE: 97.6 F | BODY MASS INDEX: 19.04 KG/M2 | OXYGEN SATURATION: 95 % | WEIGHT: 133 LBS | HEIGHT: 70 IN | HEART RATE: 74 BPM | RESPIRATION RATE: 17 BRPM | SYSTOLIC BLOOD PRESSURE: 134 MMHG | DIASTOLIC BLOOD PRESSURE: 78 MMHG

## 2025-04-10 DIAGNOSIS — R13.19 ESOPHAGEAL DYSPHAGIA: ICD-10-CM

## 2025-04-10 DIAGNOSIS — Z00.8 NUTRITIONAL ASSESSMENT: Primary | ICD-10-CM

## 2025-04-10 DIAGNOSIS — Z78.9 ON ENTERAL NUTRITION: ICD-10-CM

## 2025-04-10 PROCEDURE — 3700000000 HC ANESTHESIA ATTENDED CARE: Performed by: INTERNAL MEDICINE

## 2025-04-10 PROCEDURE — 7100000000 HC PACU RECOVERY - FIRST 15 MIN: Performed by: INTERNAL MEDICINE

## 2025-04-10 PROCEDURE — 3600000004 HC SURGERY LEVEL 4 BASE: Performed by: INTERNAL MEDICINE

## 2025-04-10 PROCEDURE — 7100000011 HC PHASE II RECOVERY - ADDTL 15 MIN: Performed by: INTERNAL MEDICINE

## 2025-04-10 PROCEDURE — 2709999900 HC NON-CHARGEABLE SUPPLY: Performed by: INTERNAL MEDICINE

## 2025-04-10 PROCEDURE — 2500000003 HC RX 250 WO HCPCS: Performed by: INTERNAL MEDICINE

## 2025-04-10 PROCEDURE — 6360000002 HC RX W HCPCS: Performed by: NURSE ANESTHETIST, CERTIFIED REGISTERED

## 2025-04-10 PROCEDURE — 3700000001 HC ADD 15 MINUTES (ANESTHESIA): Performed by: INTERNAL MEDICINE

## 2025-04-10 PROCEDURE — 6360000002 HC RX W HCPCS: Performed by: INTERNAL MEDICINE

## 2025-04-10 PROCEDURE — 2580000003 HC RX 258: Performed by: ANESTHESIOLOGY

## 2025-04-10 PROCEDURE — 3600000014 HC SURGERY LEVEL 4 ADDTL 15MIN: Performed by: INTERNAL MEDICINE

## 2025-04-10 PROCEDURE — 2500000003 HC RX 250 WO HCPCS: Performed by: NURSE ANESTHETIST, CERTIFIED REGISTERED

## 2025-04-10 PROCEDURE — 7100000001 HC PACU RECOVERY - ADDTL 15 MIN: Performed by: INTERNAL MEDICINE

## 2025-04-10 PROCEDURE — 7100000010 HC PHASE II RECOVERY - FIRST 15 MIN: Performed by: INTERNAL MEDICINE

## 2025-04-10 RX ORDER — DEXAMETHASONE SODIUM PHOSPHATE 4 MG/ML
INJECTION, SOLUTION INTRA-ARTICULAR; INTRALESIONAL; INTRAMUSCULAR; INTRAVENOUS; SOFT TISSUE
Status: DISCONTINUED | OUTPATIENT
Start: 2025-04-10 | End: 2025-04-10 | Stop reason: SDUPTHER

## 2025-04-10 RX ORDER — SODIUM CHLORIDE 0.9 % (FLUSH) 0.9 %
5-40 SYRINGE (ML) INJECTION PRN
Status: DISCONTINUED | OUTPATIENT
Start: 2025-04-10 | End: 2025-04-10 | Stop reason: HOSPADM

## 2025-04-10 RX ORDER — SODIUM CHLORIDE 0.9 % (FLUSH) 0.9 %
5-40 SYRINGE (ML) INJECTION EVERY 12 HOURS SCHEDULED
Status: DISCONTINUED | OUTPATIENT
Start: 2025-04-10 | End: 2025-04-10 | Stop reason: HOSPADM

## 2025-04-10 RX ORDER — SODIUM CHLORIDE 9 MG/ML
25 INJECTION, SOLUTION INTRAVENOUS PRN
Status: DISCONTINUED | OUTPATIENT
Start: 2025-04-10 | End: 2025-04-10 | Stop reason: HOSPADM

## 2025-04-10 RX ORDER — PROPOFOL 10 MG/ML
INJECTION, EMULSION INTRAVENOUS
Status: DISCONTINUED | OUTPATIENT
Start: 2025-04-10 | End: 2025-04-10 | Stop reason: SDUPTHER

## 2025-04-10 RX ORDER — ONDANSETRON 2 MG/ML
INJECTION INTRAMUSCULAR; INTRAVENOUS
Status: DISCONTINUED | OUTPATIENT
Start: 2025-04-10 | End: 2025-04-10 | Stop reason: SDUPTHER

## 2025-04-10 RX ORDER — LIDOCAINE HYDROCHLORIDE 20 MG/ML
INJECTION, SOLUTION EPIDURAL; INFILTRATION; INTRACAUDAL; PERINEURAL
Status: DISCONTINUED | OUTPATIENT
Start: 2025-04-10 | End: 2025-04-10 | Stop reason: SDUPTHER

## 2025-04-10 RX ORDER — NUTRITIONAL SUPPLEMENT/FIBER 0.06 G-1.4
4.5 LIQUID (ML) ORAL DAILY
Qty: 47250 ML | Refills: 5 | Status: SHIPPED | OUTPATIENT
Start: 2025-04-10

## 2025-04-10 RX ORDER — SODIUM CHLORIDE, SODIUM LACTATE, POTASSIUM CHLORIDE, CALCIUM CHLORIDE 600; 310; 30; 20 MG/100ML; MG/100ML; MG/100ML; MG/100ML
INJECTION, SOLUTION INTRAVENOUS CONTINUOUS
Status: DISCONTINUED | OUTPATIENT
Start: 2025-04-10 | End: 2025-04-10 | Stop reason: HOSPADM

## 2025-04-10 RX ADMIN — PROPOFOL 120 MG: 10 INJECTION, EMULSION INTRAVENOUS at 08:25

## 2025-04-10 RX ADMIN — WATER 2000 MG: 1 INJECTION INTRAMUSCULAR; INTRAVENOUS; SUBCUTANEOUS at 08:24

## 2025-04-10 RX ADMIN — DEXAMETHASONE SODIUM PHOSPHATE 4 MG: 4 INJECTION INTRA-ARTICULAR; INTRALESIONAL; INTRAMUSCULAR; INTRAVENOUS; SOFT TISSUE at 08:40

## 2025-04-10 RX ADMIN — SODIUM CHLORIDE, SODIUM LACTATE, POTASSIUM CHLORIDE, AND CALCIUM CHLORIDE: .6; .31; .03; .02 INJECTION, SOLUTION INTRAVENOUS at 07:19

## 2025-04-10 RX ADMIN — PROPOFOL 80 MG: 10 INJECTION, EMULSION INTRAVENOUS at 08:23

## 2025-04-10 RX ADMIN — LIDOCAINE HYDROCHLORIDE 50 MG: 20 INJECTION, SOLUTION EPIDURAL; INFILTRATION; INTRACAUDAL; PERINEURAL at 08:21

## 2025-04-10 RX ADMIN — CEFAZOLIN 2000 MG: 10 INJECTION, POWDER, FOR SOLUTION INTRAVENOUS at 08:18

## 2025-04-10 RX ADMIN — ONDANSETRON 4 MG: 2 INJECTION INTRAMUSCULAR; INTRAVENOUS at 08:40

## 2025-04-10 RX ADMIN — PROPOFOL 120 MG: 10 INJECTION, EMULSION INTRAVENOUS at 08:21

## 2025-04-10 ASSESSMENT — PAIN - FUNCTIONAL ASSESSMENT: PAIN_FUNCTIONAL_ASSESSMENT: 0-10

## 2025-04-10 NOTE — DISCHARGE INSTRUCTIONS
of your home medications that may cause drowsiness.  *  Please give a list of your current medications to your Primary Care Provider.  *  Please update this list whenever your medications are discontinued, doses are      changed, or new medications (including over-the-counter products) are added.  *  Please carry medication information at all times in case of emergency situations.    These are general instructions for a healthy lifestyle:  No smoking/ No tobacco products/ Avoid exposure to second hand smoke  Surgeon General's Warning:  Quitting smoking now greatly reduces serious risk to your health.  Obesity, smoking, and sedentary lifestyle greatly increases your risk for illness  A healthy diet, regular physical exercise & weight monitoring are important for maintaining a healthy lifestyle    You may be retaining fluid if you have a history of heart failure or if you experience any of the following symptoms:  Weight gain of 3 pounds or more overnight or 5 pounds in a week, increased swelling in our hands or feet or shortness of breath while lying flat in bed.  Please call your doctor as soon as you notice any of these symptoms; do not wait until your next office visit.

## 2025-04-10 NOTE — PROGRESS NOTES
Nutrition:  Assessment based on referral for Nutrition Services per Dr. Marla Guzman for management of tube feeding.     Food/Nutrition and Pertinent Medical History:  Diet: Liquids as tolerated for pleasure  Pt with history of H&N Cancer (Tonsil and Base of Tongue) s/p chemo and radiation in 2015.  Pt has had ongoing issues w/ dysphagia since treatment completed, worsening over the past 1-2 months, has had multiple trips to the ER over the past couple of weeks for dehydration and dysphagia.  Dr. Guzman planning on PEG placement to provide nutrition support for patient.  Pt had PEG in place during chemo/XRT, so familiar with the process.  Pt can only tolerate small sips of liquids, c/o upset stomach with Boost and can tolerate Ensure but only able to consume small amounts daily which is inadequate.  Hx H&N Cancer, hypothyroidism, dysphagia, cervical stenosis, chronic pharyngitis, CAD s/p CABG (quintuple), Eosinophilic Esophagitis, weight loss.     Anthropometrics:  Ht: 60 inches, Current BW: 133#, 80% IBW, 83% UBW (~160#, 17% wt loss x 1 month c/w severe wt loss), BMI = 25.9 (normal BW)     Estimated Nutrition Needs:  EER: 8893-2740 kcal (30-35 kcal/kg BW)   EPR: 60-80 gm protein (1-1.3 gm/kg BW)   H2O: 1 ml/kcal or per MD    Nutrition Diagnosis:  Swallowing difficulty R/T H&N cancer and post treatment side effects as evidenced by persistent and worsening dysphagia, 33# wt loss x 1 month, intolerance of solids, poor intake of liquids (sips only)    Intervention:  1. Continue w/ liquids as tolerated for pleasure and as able  2. Pt is unable to meet estimated nutrition needs orally at this time due to worsening esophageal dysphagia related to previous H&N cancer and treatment related side effects.  Pt is unable to tolerate solids, and intake of liquids is inadequate (< 400 kcal/day orally).  PEG placement on (4/10) by Dr. Guzman.  Tube feeding will serve as patient's sole source of nutrition for estimated

## 2025-04-10 NOTE — ANESTHESIA PROCEDURE NOTES
Airway  Date/Time: 4/10/2025 8:22 AM  Urgency: elective    Airway not difficult    General Information and Staff    Patient location during procedure: OR  Resident/CRNA: Hari Wilkes APRN - CRNA  Performed: resident/CRNA   Performed by: Hari Wilkes APRN - CRNA  Authorized by: Shun Clancy MD      Indications and Patient Condition  Indications for airway management: anesthesia  Spontaneous Ventilation: absent  Sedation level: deep  Preoxygenated: yes  Patient position: sniffing  MILS not maintained throughout  Mask difficulty assessment: vent by bag mask    Final Airway Details  Final airway type: endotracheal airway      Successful airway: ETT  Cuffed: yes   Successful intubation technique: video laryngoscopy  Facilitating devices/methods: intubating stylet  Endotracheal tube insertion site: oral  Blade: Wilson  Blade size: #4  ETT size (mm): 7.5  Cormack-Lehane Classification: grade I - full view of glottis  Placement verified by: chest auscultation and capnometry   Measured from: lips  ETT to lips (cm): 22  Number of attempts at approach: 1  Ventilation between attempts: bag mask  Number of other approaches attempted: 0

## 2025-04-10 NOTE — PERIOP NOTE
Discussed discharge with patient and his wife.  Also assisted in contacting the AMANDA Sanchez at [facility].   Information about feeding tube feeds, Home health, etc discussed.  Provided proper syringe for feeding tube as well.

## 2025-04-10 NOTE — ANESTHESIA POSTPROCEDURE EVALUATION
Department of Anesthesiology  Postprocedure Note    Patient: Easton Carbajal Sr.  MRN: [id number]  YOB: 1955  Date of evaluation: 4/10/2025    Procedure Summary       Date: 04/10/25 Room / Location: [facility] MAIN OR  / [facility] MAIN OR    Anesthesia Start: 0814 Anesthesia Stop: 0902    Procedure: ESOPHAGOGASTRODUODENOSCOPY PERCUTANEOUS ENDOSCOPIC GASTROSTOMY TUBE PLACEMENT (Upper GI Region) Diagnosis:       Esophageal dysphagia      (Esophageal dysphagia [R13.19])    Providers: Marla Guzman MD Responsible Provider: Shun Clancy MD    Anesthesia Type: General ASA Status: 3            Anesthesia Type: General    Job Phase I: Job Score: 7    Job Phase II:      Anesthesia Post Evaluation    Patient location during evaluation: PACU  Patient participation: complete - patient participated  Level of consciousness: awake and alert  Airway patency: patent  Nausea & Vomiting: no nausea and no vomiting  Cardiovascular status: hemodynamically stable  Respiratory status: acceptable, nonlabored ventilation and spontaneous ventilation  Hydration status: euvolemic  Comments: /68   Pulse 73   Temp 97.7 °F (36.5 °C) (Temporal)   Resp 17   Ht 1.778 m (5' 10\")   Wt 60.3 kg (133 lb)   SpO2 99%   BMI 19.08 kg/m²     Multimodal analgesia pain management approach  Pain management: adequate and satisfactory to patient    No notable events documented.

## 2025-04-10 NOTE — ANESTHESIA PRE PROCEDURE
intravenous.      Anesthetic plan and risks discussed with patient and spouse.      Plan discussed with CRNA.                    Claudette Aj MD   4/10/2025

## 2025-04-14 ENCOUNTER — CARE COORDINATION (OUTPATIENT)
Dept: CARE COORDINATION | Facility: CLINIC | Age: 70
End: 2025-04-14

## 2025-04-14 ENCOUNTER — TELEPHONE (OUTPATIENT)
Dept: GASTROENTEROLOGY | Age: 70
End: 2025-04-14

## 2025-04-14 NOTE — TELEPHONE ENCOUNTER
[facility] nurse called, patient's wife requesting a speech evaluation with [facility]. Nurse name is Argenis [phone]. Nurse requesting a call back to go over speech therapy.

## 2025-04-14 NOTE — CARE COORDINATION
Ambulatory Care Coordination Note     2025 9:49 AM     Patient Current Location:  [place]     Patient contacted the patient by telephone. Verified name and  with patient as identifiers.         ACM: Tiara Richter RN     Challenges to be reviewed by the provider   Additional needs identified to be addressed with provider No  none               Method of communication with provider: none.    Utilization: Patient has not had any utilization since our last call.     Care Summary Note: ccm outreached to patient. Patient states he is doing well. Pt states is he eating and drinking well. Patient states no questions or concerns. Ccm discussed that I would outreach next week. Patient agreeable.        Assessments Completed:   No changes since last call    Medications Reviewed:   Patient denies any changes with medications and reports taking all medications as prescribed.    Advance Care Planning:   Not reviewed during this call     Care Planning:   Education Documentation  No documentation found.  Education Comments  No comments found.     ,    Goals Addressed                   This Visit's Progress     Conditions and Symptoms   On track     I will schedule office visits, as directed by my provider.  I will keep my appointment or reschedule if I have to cancel.  I will notify my provider of any barriers to my plan of care.  I will notify my provider of any symptoms that indicate a worsening of my condition.    Barriers: none  Plan for overcoming my barriers: N/A  Confidence: 8/10  Anticipated Goal Completion Date: 25                 PCP/Specialist follow up:   Future Appointments         Provider Specialty Dept Phone    2025 11:00 AM (Arrive by 10:45 AM) Giovanna Dunlap, APRN - CNP Orthopedic Surgery [phone]    2025 8:30 AM Marla Guzman MD Gastroenterology [phone]    10/27/2025 10:20 AM [facility] LAB RESOURCE Family Medicine [phone]    11/3/2025 10:00 AM Esaton Salas Jr.,

## 2025-04-14 NOTE — TELEPHONE ENCOUNTER
Returned call to Argenis with [facility] as requested.     Argenis clarified that she was reaching out bc they need a provider to sign off on Home Health Orders following pt's newly placed PEG by Dr. Guzman.     Argenis states Dr. Guzman was primary MD listed with referral, so contacted us first, but can reach out to PCP for management of HH orders if that is what Dr. Guzman advises. RN to check with MD then call Argenis back with answer.     Message sent to Dr. Guzman to advise if she will be signing HH orders or needs to be pt's PCP. Awaiting provider response at this time.

## 2025-04-15 ENCOUNTER — TELEPHONE (OUTPATIENT)
Dept: FAMILY MEDICINE CLINIC | Facility: CLINIC | Age: 70
End: 2025-04-15

## 2025-04-15 NOTE — TELEPHONE ENCOUNTER
Who's Calling: Argenis  From Where: [facility]    Message: Need verbal orders for plan of care for nursing. Also asking about speech therapy evaluation    Phone #: [phone]  Fax #:

## 2025-04-15 NOTE — TELEPHONE ENCOUNTER
Returned call to Argenis with [facility], relayed message from Dr. Guzman for pt's long term HH orders to be managed by PCP. Argenis verbalized understanding, agreeable to reach out to PCP for signature on HH orders.

## 2025-04-18 ENCOUNTER — TELEPHONE (OUTPATIENT)
Dept: FAMILY MEDICINE CLINIC | Facility: CLINIC | Age: 70
End: 2025-04-18

## 2025-04-18 DIAGNOSIS — R13.12 OROPHARYNGEAL DYSPHAGIA: Primary | ICD-10-CM

## 2025-04-18 DIAGNOSIS — R63.30 FEEDING DIFFICULTIES, UNSPECIFIED: ICD-10-CM

## 2025-04-18 NOTE — TELEPHONE ENCOUNTER
Who's Calling: Tita  From Where: [facility] [place] [facility]    Message: They need verbal orders to add physical therapy.      Phone #: [phone]

## 2025-04-21 ENCOUNTER — CARE COORDINATION (OUTPATIENT)
Dept: CARE COORDINATION | Facility: CLINIC | Age: 70
End: 2025-04-21

## 2025-04-21 NOTE — CARE COORDINATION
Ambulatory Care Coordination Note     2025 9:25 AM     Patient Current Location:  [place]     ACM contacted the patient by telephone. Verified name and  with patient as identifiers.         ACM: Tiara Richter RN     Challenges to be reviewed by the provider   Additional needs identified to be addressed with provider No  none               Method of communication with provider: none.    Utilization: Patient has not had any utilization since our last call.     Care Summary Note: ccm outreached to patient. Patient states he has been working with speech therapy and has swallow study coming up soon. Pt states he still struggles with swallowing at this time. Discussed with patient if this persists to call pcp. Pt verbalized understanding. Pt states no questions at this time. Ccm discussed that I would outreach next week. Pt agreeable.        Assessments Completed:   No changes since last call    Medications Reviewed:   Patient denies any changes with medications and reports taking all medications as prescribed.    Advance Care Planning:   Not reviewed during this call     Care Planning:   Education Documentation  No documentation found.  Education Comments  No comments found.     ,    Goals Addressed                   This Visit's Progress     Conditions and Symptoms   On track     I will schedule office visits, as directed by my provider.  I will keep my appointment or reschedule if I have to cancel.  I will notify my provider of any barriers to my plan of care.  I will notify my provider of any symptoms that indicate a worsening of my condition.    Barriers: none  Plan for overcoming my barriers: N/A  Confidence: 8/10  Anticipated Goal Completion Date: 25                 PCP/Specialist follow up:   Future Appointments         Provider Specialty Dept Phone    2025 11:00 AM (Arrive by 10:45 AM) Giovanna Dunlap, APRN - CNP Orthopedic Surgery [phone]    2025 8:30 AM Marla Guzman MD

## 2025-04-22 ENCOUNTER — TELEPHONE (OUTPATIENT)
Dept: FAMILY MEDICINE CLINIC | Facility: CLINIC | Age: 70
End: 2025-04-22

## 2025-04-22 NOTE — TELEPHONE ENCOUNTER
Shaun from [facility] called. She states he's having some confusion. His wife is asking for a urine sample and some labs at the [facility] next visit tomorrow. Please call Shaun at [phone].

## 2025-04-23 ENCOUNTER — TELEPHONE (OUTPATIENT)
Dept: FAMILY MEDICINE CLINIC | Facility: CLINIC | Age: 70
End: 2025-04-23

## 2025-04-23 DIAGNOSIS — R41.0 DISORIENTATION: Primary | ICD-10-CM

## 2025-04-23 NOTE — TELEPHONE ENCOUNTER
Shaun from [facility] called regarding patient and needs to speak with the nurse. She said the message is quite lengthy. Please call Shaun at [phone].

## 2025-04-24 NOTE — TELEPHONE ENCOUNTER
Shaun los labs except for cmp because his veins blew. She will try again next week. Urine was very dark. They are only giving 6oz before his feed by tube 4x per day. Shaun wanted to know if he could increase his water intake.  Pt is very confused and does not know what day it is and his children's names.

## 2025-04-29 ENCOUNTER — HOSPITAL ENCOUNTER (OUTPATIENT)
Dept: GENERAL RADIOLOGY | Age: 70
Discharge: HOME | End: 2025-05-01
Attending: FAMILY MEDICINE
Payer: MEDICARE

## 2025-04-29 ENCOUNTER — APPOINTMENT (OUTPATIENT)
Dept: MRI IMAGING | Age: 70
End: 2025-04-29
Attending: FAMILY MEDICINE
Payer: MEDICARE

## 2025-04-29 DIAGNOSIS — R13.12 OROPHARYNGEAL DYSPHAGIA: ICD-10-CM

## 2025-04-29 DIAGNOSIS — R63.30 FEEDING DIFFICULTIES, UNSPECIFIED: ICD-10-CM

## 2025-04-29 PROCEDURE — 74230 X-RAY XM SWLNG FUNCJ C+: CPT

## 2025-04-29 PROCEDURE — 92611 MOTION FLUOROSCOPY/SWALLOW: CPT

## 2025-04-29 PROCEDURE — 2500000003 HC RX 250 WO HCPCS: Performed by: FAMILY MEDICINE

## 2025-04-29 RX ADMIN — BARIUM SULFATE 15 ML: 0.81 POWDER, FOR SUSPENSION ORAL at 10:09

## 2025-04-29 RX ADMIN — BARIUM SULFATE 15 ML: 400 SUSPENSION ORAL at 10:09

## 2025-04-29 NOTE — THERAPY EVALUATION
reinforcement.    **It should be noted that this instrumental assessment presents only a brief snapshot of the patient's true swallow-functioning; results should be interpreted within the context of overall clinical presentation of the patient's swallow status, prior/current medical history, and clinical indicators noted or observed during formal/informal assessment.**    Therapy Time  SLP Individual Minutes  Time In: 0950  Time Out: 1020  Minutes: 30    Brooklyn Gustafson MA, CCC-SLP

## 2025-05-01 ENCOUNTER — TELEPHONE (OUTPATIENT)
Dept: FAMILY MEDICINE CLINIC | Facility: CLINIC | Age: 70
End: 2025-05-01

## 2025-05-01 NOTE — TELEPHONE ENCOUNTER
Who's Calling:  From Where: [facility]    Message:  ordered him mri of brain. Pt made an appt to go but pt refused to go. Pt does not think he can handle it due to coughing & having to lay flat on his back. Wondering if there are other options or suggestions? If a brain scan can't be done, hospice is their next step.     Phone #: [phone]  Fax #:

## 2025-05-01 NOTE — TELEPHONE ENCOUNTER
Pt is afraid of laying down because of how much saliva he has in his mouth he always has to cough. They were wondering if there was anything that they could do sitting up. Shaun has check but only knows of the open MRI which would still not work. Pt has appt Monday and will discuss.

## 2025-05-02 ENCOUNTER — TELEPHONE (OUTPATIENT)
Age: 70
End: 2025-05-02

## 2025-05-02 NOTE — TELEPHONE ENCOUNTER
LVM on secure line for Donna with [facility] to confirm she received fax back with signed orders from Dr. Guzman, sent earlier this morning 5/2/25. Requested call back from Donna only if needing anything further from [facility]. Gave direct RN line [phone] if needed.

## 2025-05-02 NOTE — TELEPHONE ENCOUNTER
Received call on 5/1/25 from Donna with [facility] regarding pt's orders for PEG tube feed/supplies (PEG placed by Dr. Guzman 4/10/25). Requesting signature from Dr. Guzman.     Per Donna, Dr. Guzman needs to sign an additional order form for pt's PEG feed/supply orders - form required for Medicare Billing, needs sent back ASAP. Forms received from Donna via fax. Reached out to Laura Poe RD to have her review orders and confirm they are correct/in line with current plan. Per AMANDA Sanchez, orders correct for Dr. Guzman to sign.     Orders signed by Dr. Guzman as requested, faxed to Donna with [facility] at (f) [phone] on 5/2/25. Confirmation fax received.     ----------------------------------------    *Of note, Laura Poe RD is managing pt's tube feed orders as pt was previously established with her/oncology in the past. Pt not currently seeing oncology for this matter, so Dr. Guzman will need to sign orders for tube feeds moving forward around every 3-6 months per Laura. Pt's HH orders being managed by PCP.         ----------------------------------------    CONTACT INFO:  Donna Rao with [facility] ([facility]) - direct phone: [phone] ext [phone]

## 2025-05-05 ENCOUNTER — OFFICE VISIT (OUTPATIENT)
Dept: FAMILY MEDICINE CLINIC | Facility: CLINIC | Age: 70
End: 2025-05-05
Payer: MEDICARE

## 2025-05-05 ENCOUNTER — PATIENT MESSAGE (OUTPATIENT)
Dept: FAMILY MEDICINE CLINIC | Facility: CLINIC | Age: 70
End: 2025-05-05

## 2025-05-05 ENCOUNTER — CARE COORDINATION (OUTPATIENT)
Dept: CARE COORDINATION | Facility: CLINIC | Age: 70
End: 2025-05-05

## 2025-05-05 VITALS
BODY MASS INDEX: 19.8 KG/M2 | HEART RATE: 101 BPM | SYSTOLIC BLOOD PRESSURE: 118 MMHG | DIASTOLIC BLOOD PRESSURE: 62 MMHG | WEIGHT: 138 LBS | TEMPERATURE: 97.2 F | OXYGEN SATURATION: 90 %

## 2025-05-05 DIAGNOSIS — R63.4 WEIGHT LOSS: ICD-10-CM

## 2025-05-05 DIAGNOSIS — R53.83 FATIGUE, UNSPECIFIED TYPE: ICD-10-CM

## 2025-05-05 DIAGNOSIS — R41.82 ALTERED MENTAL STATUS, UNSPECIFIED ALTERED MENTAL STATUS TYPE: ICD-10-CM

## 2025-05-05 DIAGNOSIS — R13.19 ESOPHAGEAL DYSPHAGIA: ICD-10-CM

## 2025-05-05 DIAGNOSIS — R05.3 CHRONIC COUGH: ICD-10-CM

## 2025-05-05 DIAGNOSIS — E03.9 ACQUIRED HYPOTHYROIDISM: ICD-10-CM

## 2025-05-05 DIAGNOSIS — R41.82 ALTERED MENTAL STATUS, UNSPECIFIED ALTERED MENTAL STATUS TYPE: Primary | ICD-10-CM

## 2025-05-05 DIAGNOSIS — R68.89 EXCESSIVE ORAL SECRETIONS: ICD-10-CM

## 2025-05-05 DIAGNOSIS — F41.9 ANXIETY: ICD-10-CM

## 2025-05-05 DIAGNOSIS — F32.A MILD DEPRESSION: ICD-10-CM

## 2025-05-05 LAB
APPEARANCE UR: CLEAR
BACTERIA URNS QL MICRO: NEGATIVE /HPF
BILIRUB UR QL: NEGATIVE
COLOR UR: ABNORMAL
EPI CELLS #/AREA URNS HPF: ABNORMAL /HPF (ref 0–5)
GLUCOSE UR STRIP.AUTO-MCNC: NEGATIVE MG/DL
HGB UR QL STRIP: NEGATIVE
HYALINE CASTS URNS QL MICRO: ABNORMAL /LPF
KETONES UR QL STRIP.AUTO: ABNORMAL MG/DL
LEUKOCYTE ESTERASE UR QL STRIP.AUTO: ABNORMAL
NITRITE UR QL STRIP.AUTO: NEGATIVE
PH UR STRIP: 5 (ref 5–9)
PROT UR STRIP-MCNC: ABNORMAL MG/DL
RBC #/AREA URNS HPF: ABNORMAL /HPF (ref 0–5)
SP GR UR REFRACTOMETRY: 1.02 (ref 1–1.02)
UROBILINOGEN UR QL STRIP.AUTO: 1 EU/DL (ref 0.2–1)
WBC URNS QL MICRO: ABNORMAL /HPF (ref 0–4)

## 2025-05-05 PROCEDURE — G8427 DOCREV CUR MEDS BY ELIG CLIN: HCPCS | Performed by: FAMILY MEDICINE

## 2025-05-05 PROCEDURE — 1159F MED LIST DOCD IN RCRD: CPT | Performed by: FAMILY MEDICINE

## 2025-05-05 PROCEDURE — 1123F ACP DISCUSS/DSCN MKR DOCD: CPT | Performed by: FAMILY MEDICINE

## 2025-05-05 PROCEDURE — G8420 CALC BMI NORM PARAMETERS: HCPCS | Performed by: FAMILY MEDICINE

## 2025-05-05 PROCEDURE — 1036F TOBACCO NON-USER: CPT | Performed by: FAMILY MEDICINE

## 2025-05-05 PROCEDURE — 3017F COLORECTAL CA SCREEN DOC REV: CPT | Performed by: FAMILY MEDICINE

## 2025-05-05 PROCEDURE — 99214 OFFICE O/P EST MOD 30 MIN: CPT | Performed by: FAMILY MEDICINE

## 2025-05-05 RX ORDER — GLYCOPYRROLATE 1 MG/1
1 TABLET ORAL 3 TIMES DAILY
Qty: 90 TABLET | Refills: 3 | Status: SHIPPED | OUTPATIENT
Start: 2025-05-05

## 2025-05-05 RX ORDER — LORAZEPAM 1 MG/1
TABLET ORAL
Qty: 60 TABLET | Refills: 3 | Status: SHIPPED | OUTPATIENT
Start: 2025-05-05 | End: 2025-06-05

## 2025-05-05 ASSESSMENT — ENCOUNTER SYMPTOMS
TROUBLE SWALLOWING: 1
EYES NEGATIVE: 1
ALLERGIC/IMMUNOLOGIC NEGATIVE: 1
GASTROINTESTINAL NEGATIVE: 1
SORE THROAT: 0
RESPIRATORY NEGATIVE: 1
RHINORRHEA: 0

## 2025-05-05 NOTE — CARE COORDINATION
Ambulatory Care Coordination Note     2025 10:24 AM     Patient Current Location:  [place]     ACM contacted the patient by telephone. Verified name and  with patient as identifiers.         ACM: Tiara Richter RN     Challenges to be reviewed by the provider   Additional needs identified to be addressed with provider No  none               Method of communication with provider: none.    Utilization: Patient has not had any utilization since our last call.     Care Summary Note: ccm outreached to patient's wife. Wife states patient has had peg tube placed and has been tolerating feedings. Wife states speech continues to work with patient but will discharge soon. Wife states patient has appointment with GI today. Wife states no questions or concerns. [facility] discussed that I would outreach next week. Wife agreeable.          Assessments Completed:   No changes since last call    Medications Reviewed:   Patient denies any changes with medications and reports taking all medications as prescribed.    Advance Care Planning:   Not reviewed during this call     Care Planning:   Education Documentation  No documentation found.  Education Comments  No comments found.     ,    Goals Addressed                   This Visit's Progress     Conditions and Symptoms   On track     I will schedule office visits, as directed by my provider.  I will keep my appointment or reschedule if I have to cancel.  I will notify my provider of any barriers to my plan of care.  I will notify my provider of any symptoms that indicate a worsening of my condition.    Barriers: none  Plan for overcoming my barriers: N/A  Confidence: 8/10  Anticipated Goal Completion Date: 25                 PCP/Specialist follow up:   Future Appointments         Provider Specialty Dept Phone    2025 1:30 PM Easton Salas Jr., MD Family Medicine [phone]    2025 8:30 AM Marla Guzman MD Gastroenterology [phone]    10/27/2025

## 2025-05-05 NOTE — PROGRESS NOTES
HISTORY OF PRESENT ILLNESS    Laverne Carbajal . is a 69 y.o. male.  HPI    Review of Systems     Physical Exam     ASSESSMENT and PLAN    Laverne \"[place]\" was seen today for mental health problem.    Diagnoses and all orders for this visit:    Altered mental status, unspecified altered mental status type  -     Cancel: Vitamin B12; Future  -     Cancel: Vitamin B1, Whole Blood; Future  -     [facility] [place]    Esophageal dysphagia  -     [facility]    Anxiety  -     LORazepam (ATIVAN) 1 MG tablet; 1/2-1 po q8h prn anxiety or sleep    Mild depression  -     sertraline (ZOLOFT) 50 MG tablet; Take 1 tablet by mouth daily    Acquired hypothyroidism    Fatigue, unspecified type  -     Cancel: CBC with Auto Differential; Future  -     Cancel: Comprehensive Metabolic Panel; Future  -     Cancel: TSH; Future  -     Urinalysis; Future  -     Cancel: Vitamin B12; Future  -     Urinalysis    Chronic cough  -     XR CHEST PA LAT (2 VIEWS); Future    Weight loss  -     XR CHEST PA LAT (2 VIEWS); Future  -     [facility]    Excessive oral secretions  -     glycopyrrolate (ROBINUL) 1 MG tablet; Take 1 tablet by mouth 3 times daily         Return in about 3 months (around 8/5/2025), or if symptoms worsen or fail to improve.    LAVERNE KILGORE JR, MD

## 2025-05-05 NOTE — PROGRESS NOTES
HISTORY OF PRESENT ILLNESS    Easton Carbajal Sr. is a 69 y.o. male.  HPI  Chief Complaint   Patient presents with    Mental Health Problem     See above. Patient's health has rapidly deteriorated over the last 3-4 months. 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 During the last year had progressive difficulty swallowing with associated weight loss.  Had EGD in January. Results indicated dysmotility; no mass or obstruction. Symptoms progressed to needing PEG implant for nutrition, etc. In the last 6 weeks family has noticed altered mental status. Frequently forgetful with confusion.   MRI of brain was planned but pt cancelled because he could not tolerate the procedure.  Remains at home. Feels tired all the time. Wife states he is in bed for around 22 hours regularly. Able to go to the bathroom and perform appropriate hygiene without assistance. Complains of pain but does not indicate a specific site of pain. Has copious oral secretions that cause periodic cough especially in the evening. Family has noticed increased sensitivity to sounds which irritate patient. Also seems hypersensitive to touch. States that even light touch is irritating to his skin. Patient indicates obvious frustration with hi situation. Frequently expresses anxiety and has difficulty falling asleep. Mood is often depressed. States he wishes he did not have to deal with all that is happening but no specific suicidal ideation is apparent.  Wife is looking in to hospice for assistance including a suction unit to deal with excessive oral secretions.      Current Outpatient Medications on File Prior to Visit   Medication Sig Dispense Refill    Nutritional Supplements ([email or web address] STANDARD 1.4) LIQD 4.5 each by PEG Tube route daily [employer] Bolus Supplies. Centrify 1.4, goal 4.5 cartons/day to provides 2047 kcal, 90 gm pro, 1053 ml (free H20). Give 1.5 cartons TID, flush w/ 60 ml free H20 after each feed. Pt needs extra 27

## 2025-05-06 RX ORDER — ONDANSETRON 4 MG/1
4 TABLET, FILM COATED ORAL EVERY 8 HOURS PRN
Qty: 90 TABLET | Refills: 3 | Status: SHIPPED | OUTPATIENT
Start: 2025-05-06

## 2025-05-07 ENCOUNTER — PATIENT MESSAGE (OUTPATIENT)
Dept: FAMILY MEDICINE CLINIC | Facility: CLINIC | Age: 70
End: 2025-05-07

## 2025-05-07 ENCOUNTER — TELEPHONE (OUTPATIENT)
Dept: FAMILY MEDICINE CLINIC | Facility: CLINIC | Age: 70
End: 2025-05-07

## 2025-05-07 DIAGNOSIS — R68.89 EXCESSIVE ORAL SECRETIONS: ICD-10-CM

## 2025-05-07 DIAGNOSIS — R41.0 DISORIENTATION: ICD-10-CM

## 2025-05-07 DIAGNOSIS — R41.82 ALTERED MENTAL STATUS, UNSPECIFIED ALTERED MENTAL STATUS TYPE: Primary | ICD-10-CM

## 2025-05-07 DIAGNOSIS — R20.0 NUMBNESS OF FINGERS: ICD-10-CM

## 2025-05-07 DIAGNOSIS — Z85.810 HISTORY OF TONGUE CANCER: ICD-10-CM

## 2025-05-07 DIAGNOSIS — R13.19 ESOPHAGEAL DYSPHAGIA: ICD-10-CM

## 2025-05-07 NOTE — TELEPHONE ENCOUNTER
Who's Calling: [facility]    Message: They need a H&P (history and physical) sent over to them to compete the referral.    Phone #: [phone]  Fax #:  [phone]

## 2025-05-12 ENCOUNTER — CARE COORDINATION (OUTPATIENT)
Dept: CARE COORDINATION | Facility: CLINIC | Age: 70
End: 2025-05-12

## 2025-05-12 NOTE — CARE COORDINATION
Ambulatory Care Coordination Note     5/12/2025 5:27 PM     ACM outreach attempt by this ACM today to perform care management follow up . ACM was unable to reach the patient by telephone today;   left voice message requesting a return phone call to this ACM.     ACM: Tiara Richter RN         PCP/Specialist follow up:   Future Appointments         Provider Specialty Dept Phone    9/26/2025 8:30 AM Marla Guzman MD Gastroenterology [phone]    10/27/2025 10:20 AM F LAB RESOURCE [facility] [phone]    11/3/2025 10:00 AM Easton Salas Jr., MD [facility] [phone]            Follow Up:   Plan for next ACM outreach in approximately 1 week to complete:  - goal progression  - education .

## 2025-05-18 ENCOUNTER — PATIENT MESSAGE (OUTPATIENT)
Dept: FAMILY MEDICINE CLINIC | Facility: CLINIC | Age: 70
End: 2025-05-18

## 2025-05-19 ENCOUNTER — CARE COORDINATION (OUTPATIENT)
Dept: CARE COORDINATION | Facility: CLINIC | Age: 70
End: 2025-05-19

## 2025-05-19 NOTE — CARE COORDINATION
Ambulatory Care Coordination Note     5/19/2025 10:25 AM     ACM outreach attempt by this ACM today to perform care management follow up . ACM was unable to reach the patient by telephone today;   left voice message requesting a return phone call to this ACM.     ACM: Tiara Richter RN         PCP/Specialist follow up:   Future Appointments         Provider Specialty Dept Phone    6/6/2025 10:30 AM Dejuan Matson MD Neurology [phone]    9/26/2025 8:30 AM Marla Guzman MD Gastroenterology [phone]    10/27/2025 10:20 AM [facility] LAB RESOURCE [facility] [phone]    11/3/2025 10:00 AM Easton Salas Jr., MD [facility] [phone]            Follow Up:   Plan for next ACM outreach in approximately 1 week to complete:  - goal progression  - education .

## 2025-06-02 NOTE — CARE COORDINATION
Ambulatory Care Coordination Note     6/2/2025 1:41 PM     ACM outreach attempt by this ACM today to perform care management follow up . ACM was unable to reach the patient by telephone today;   left voice message requesting a return phone call to this ACM.     ACM: Tiara Richter RN         PCP/Specialist follow up:   Future Appointments         Provider Specialty Dept Phone    6/6/2025 10:30 AM Dejuan Matson MD Neurology [phone]    9/26/2025 8:30 AM Marla Guzman MD Gastroenterology [phone]    10/27/2025 10:20 AM \[facility]\"" LAB RESOURCE [facility] [phone]    11/3/2025 10:00 AM Easton Salas Jr., MD [facility] [phone]            Follow Up:   Plan for next ACM outreach in approximately 1 week to complete:  - goal progression  - education .

## 2025-06-09 ENCOUNTER — CARE COORDINATION (OUTPATIENT)
Dept: CARE COORDINATION | Facility: CLINIC | Age: 70
End: 2025-06-09

## (undated) DEVICE — SNARE VASC L240CM LOOP W10MM SHTH DIA2.4MM RND STIFF CLD

## (undated) DEVICE — SINGLE PORT MANIFOLD: Brand: NEPTUNE 2

## (undated) DEVICE — BLOCK BITE AD 60FR W/ VELC STRP ADDRESSES MOST PT AND

## (undated) DEVICE — CONNECTOR TBNG OD5-7MM O2 END DISP

## (undated) DEVICE — SYRINGE MED 10ML LUERLOCK TIP W/O SFTY DISP

## (undated) DEVICE — AIRLIFE™ OXYGEN TUBING 7 FEET (2.1 M) CRUSH RESISTANT OXYGEN TUBING, VINYL TIPPED: Brand: AIRLIFE™

## (undated) DEVICE — NEEDLE SYRINGE 18GA L1.5IN RED PLAS HUB S STL BLNT FILL W/O

## (undated) DEVICE — CONTAINER FORMALIN PREFILLED 10% NBF 60ML

## (undated) DEVICE — FORCEPS BX L240CM JAW DIA2.8MM L CAP W/ NDL MIC MESH TOOTH

## (undated) DEVICE — ENDOSCOPIC KIT 1.1+ OP4 CA DE 2 GWN AAMI LEVEL 3

## (undated) DEVICE — GAUZE,SPONGE,4"X4",12PLY,WOVEN,NS,LF: Brand: MEDLINE

## (undated) DEVICE — MOUTHPIECE ENDOSCP L CTRL OPN AND SIDE PORTS DISP

## (undated) DEVICE — KENDALL RADIOLUCENT FOAM MONITORING ELECTRODE RECTANGULAR SHAPE: Brand: KENDALL

## (undated) DEVICE — SYRINGE MEDICAL 3ML CLEAR PLASTIC STANDARD NON CONTROL LUERLOCK TIP DISPOSABLE

## (undated) DEVICE — DISPOSABLE BIOPSY VALVE MAJ-1555: Brand: SINGLE USE BIOPSY VALVE (STERILE)

## (undated) DEVICE — TRAP SPEC POLYP REM STRNR CLN DSGN MAGNIFYING WIND DISP

## (undated) DEVICE — GUIDEWIRE WITH SPRING TIP - SINGLE USE: Brand: SAFEGUIDE®

## (undated) DEVICE — LUBE JELLY FOIL PACK 1.4 OZ: Brand: MEDLINE INDUSTRIES, INC.

## (undated) DEVICE — GARMENT,MEDLINE,DVT,INT,CALF,MED, GEN2: Brand: MEDLINE